# Patient Record
Sex: MALE | Race: AMERICAN INDIAN OR ALASKA NATIVE | Employment: PART TIME | URBAN - METROPOLITAN AREA
[De-identification: names, ages, dates, MRNs, and addresses within clinical notes are randomized per-mention and may not be internally consistent; named-entity substitution may affect disease eponyms.]

---

## 2015-10-19 LAB
ALT SERPL-CCNC: 30 U/L (ref 9–72)
AST SERPL-CCNC: 24 U/L (ref 14–59)
CREAT SERPL-MCNC: 1.2 MG/DL (ref 0.7–1.3)
GFR SERPL CREATININE-BSD FRML MDRD: 61.51 ML/MIN/1.73M2
GLUCOSE SERPL-MCNC: 111 MG/DL (ref 65–100)
POTASSIUM SERPL-SCNC: 4.5 MMOL/L (ref 3.6–5)

## 2018-03-13 ENCOUNTER — TRANSFERRED RECORDS (OUTPATIENT)
Dept: HEALTH INFORMATION MANAGEMENT | Facility: CLINIC | Age: 63
End: 2018-03-13

## 2018-04-17 ENCOUNTER — TRANSFERRED RECORDS (OUTPATIENT)
Dept: HEALTH INFORMATION MANAGEMENT | Facility: CLINIC | Age: 63
End: 2018-04-17

## 2018-04-17 ENCOUNTER — HOSPITAL ENCOUNTER (INPATIENT)
Facility: HOSPITAL | Age: 63
LOS: 8 days | Discharge: HOME | DRG: 885 | End: 2018-04-25
Attending: PSYCHIATRY & NEUROLOGY | Admitting: PSYCHIATRY & NEUROLOGY
Payer: COMMERCIAL

## 2018-04-17 DIAGNOSIS — F31.63: Primary | ICD-10-CM

## 2018-04-17 DIAGNOSIS — R60.0 LOCALIZED EDEMA: ICD-10-CM

## 2018-04-17 LAB
ALT SERPL-CCNC: 25 IU/L (ref 6–40)
AST SERPL-CCNC: 29 IU/L (ref 10–40)
CREAT SERPL-MCNC: 1.08 MG/DL (ref 0.7–1.2)
GLUCOSE SERPL-MCNC: 98 MG/DL (ref 70–100)
POTASSIUM SERPL-SCNC: 4.2 MEQ/L (ref 3.4–5.1)

## 2018-04-17 PROCEDURE — 12400000 ZZH R&B MH

## 2018-04-17 NOTE — IP AVS SNAPSHOT
[facility]    [address] [place] [address]    Phone:  [phone]    Fax:  [phone]                                       After Visit Summary   4/17/2018    Дмитрий Fleming    MRN: [id number]           After Visit Summary Signature Page     I have received my discharge instructions, and my questions have been answered. I have discussed any challenges I see with this plan with the nurse or doctor.    ..........................................................................................................................................  Patient/Patient Representative Signature      ..........................................................................................................................................  Patient Representative Print Name and Relationship to Patient    ..................................................               ................................................  Date                                            Time    ..........................................................................................................................................  Reviewed by Signature/Title    ...................................................              ..............................................  Date                                                            Time

## 2018-04-17 NOTE — IP AVS SNAPSHOT
MRN:[id number]                      After Visit Summary   4/17/2018    Дмитрий Fleming    MRN: [id number]           Thank you!     Thank you for choosing [facility] for your care. Our goal is always to provide you with excellent care. Hearing back from our patients is one way we can continue to improve our services. Please take a few minutes to complete the written survey that you may receive in the mail after you visit with us. Thank you!        Patient Information     Date Of Birth          1955        Designated Caregiver       Most Recent Value    Caregiver    Will someone help with your care after discharge? no      About your hospital stay     You were admitted on:  April 17, 2018 You last received care in the:  [facility]    You were discharged on:  April 25, 2018       Who to Call     For medical emergencies, please call 911.  For non-urgent questions about your medical care, please call your primary care provider or clinic, [phone]          Attending Provider     Provider Specialty    Moise Butt MD Psychiatry    Kaci Santana Mc, APRN CNP Nurse Practitioner Psych/Mental Health       Primary Care Provider Office Phone # Fax #    Marvin Hurst [phone]      Further instructions from your care team       Behavioral Discharge Planning and Instructions    Summary: Дмитрий was admitted to  due to manic symptoms and bipolar     Main Diagnosis: Bipolar Disorder 1, most recent episode manic    Major Treatments, Procedures and Findings: Stabilize with medications, connect with community programs.    Symptoms to Report: feeling more aggressive, increased confusion, losing more sleep, mood getting worse or thoughts of suicide    Lifestyle Adjustment: Take all medications as prescribed, meet with doctor/ medication provider, out patient therapist, , and [facility] worker as scheduled. Abstain from alcohol or any unprescribed drugs.      Psychiatry  Follow-up:     [facility] will be at your home and will call you 24hrs before apt.   [facility] ADRIANA Guerrero 5/23 @3   Miriam Bernal 6/14 @2  Phone: [phone]  Fax: [phone]    [facility]   PCP- Aris- 5/7 @4  [address]  [address]  [place] [address]  P: [phone]  F: [phone]    [facility]   Therapy- 4/30 @12:45 w/Ned Brunson   [address]    [place] [address]   P: [phone]  F:[phone]    Resources:   Crisis Intervention: 167.348.3644 or 036-871-6388 (TTY: 146.839.6539).  Call anytime for help.  National [place] on Mental Illness (www.[place].immanuel.org): 486.747.9799 or 417-636-3965.  Alcoholics Anonymous (www.alcoholics-anonymous.org): Check your phone book for your local chapter.  Suicide Awareness Voices of Education (SAVE) (www.save.org): 128-366-GNPT (4555)  National Suicide Prevention Line (www.mentalhealthmn.org): 761-912-TCGE (3122)  Mental Health Consumer/Survivor Network of [place] (www.mhcsn.net): 357.795.3401 or 837-201-3626    General Medication Instructions:   See your medication sheet(s) for instructions.   Take all medicines as directed.  Make no changes unless your doctor suggests them.   Go to all your doctor visits.  Be sure to have all your required lab tests. This way, your medicines can be refilled on time.  Do not use any drugs not prescribed by your doctor.  Avoid alcohol.    [place] Area:  [facility], [facility] stabilization [facility]- [phone]  [facility] Crisis Line: 1-901.995.3722  Advocates For Family Peace: 868-8795  Sexual Assault Program [facility]: 488.180.6402 or 1-953.750.7230  [place] Battered Women's Program: 6-243-281-0244 Ext: 279       Calls answered Mon-Fri-8:00 am--4:30 pm    [place]:  [facility]ates for Family Peace: 1-621.766.7715  [facility] first call for help: 1-563.458.1808  [facility]:  (554) 351-2371      [place] Area:  Warm Line: 1-293.218.5803       Calls  "answered Tuesday--Saturday 4:00 pm--10:00 pm  [place] Crisis Line - 990-940-5736  [place] [facility] Stabilization 250-287-2323    [place] Statewide:  [place] Crisis and Referral Services: 1-187.588.6897  National Suicide Prevention Lifeline: 3-077-938-TALK (8975)   - xbr9zyzr- Text  Life  to [address]  First Call for Help:   [facility] Helpline- 7-076-BOMO-HELP        Pending Results     No orders found from 4/15/2018 to 2018.            Statement of Approval     Ordered          18 1312  I have reviewed and agree with all the recommendations and orders detailed in this document.  EFFECTIVE NOW     Approved and electronically signed by:  Sarai Bell APRN CNP             Admission Information     Date & Time Provider Department Dept. Phone    2018 Kaci Santana Mc, APRN CNP [facility] [phone]      Your Vitals Were     Blood Pressure Pulse Temperature Respirations Weight Pulse Oximetry    128/73 84 97.1  F (36.2  C) (Tympanic) 16 125.1 kg (275 lb 12.8 oz) 97%      MyChart Information     [employer] lets you send messages to your doctor, view your test results, renew your prescriptions, schedule appointments and more. To sign up, go to [email or web address] . Click on \"Log in\" on the left side of the screen, which will take you to the Welcome page. Then click on \"Sign up Now\" on the right side of the page.     You will be asked to enter the access code listed below, as well as some personal information. Please follow the directions to create your username and password.     Your access code is: [email or web address]  Expires: 2018 11:28 AM     Your access code will  in 90 days. If you need help or a new code, please call your [facility] clinic or [phone].        Care EveryWhere ID     This is your Care EveryWhere ID. This could be used by other organizations to access your [facility] medical records  [id number]        Equal Access to Services     NARINDER YAP : Radha more" Sobusterali, waaxda luqadaha, qaybta kaalmada lauro, denise wususan mary kate. So [facility] [place] [facility] [phone].    ATENCIÓN: Si brenda espinoza, tiene a lira disposición servicios gratuitos de asistencia lingüística. Addison al [phone].    We comply with applicable federal civil rights laws and [place] laws. We do not discriminate on the basis of race, color, national origin, age, disability, sex, sexual orientation, or gender identity.               Review of your medicines      START taking        Dose / Directions    * divalproex sodium extended-release 500 MG 24 hr tablet   Commonly known as:  DEPAKOTE ER        Dose:  1000 mg   Start taking on:  4/26/2018   Take 2 tablets (1,000 mg) by mouth daily   Quantity:  60 tablet   Refills:  0       * divalproex sodium extended-release 250 MG 24 hr tablet   Commonly known as:  DEPAKOTE ER        Dose:  250 mg   Start taking on:  4/26/2018   Take 1 tablet (250 mg) by mouth daily   Quantity:  30 tablet   Refills:  0       triamterene-hydrochlorothiazide 37.5-25 MG per capsule   Commonly known as:  DYAZIDE   Used for:  Localized edema        Dose:  1 capsule   Start taking on:  4/26/2018   Take 1 capsule by mouth daily   Quantity:  30 capsule   Refills:  0       * Notice:  This list has 2 medication(s) that are the same as other medications prescribed for you. Read the directions carefully, and ask your doctor or other care provider to review them with you.      CONTINUE these medicines which have NOT CHANGED        Dose / Directions    CLONAZEPAM PO        Dose:  0.5 mg   Take 0.5 mg by mouth At Bedtime Take one to two tablets at bedtime   Refills:  0       DESYREL PO        Dose:  200 mg   Take 200 mg by mouth Take two tablets at bedtime   Refills:  0       HYDROXYZINE HCL PO        Dose:  50 mg   Take 50 mg by mouth Take 1/2 to 1 tablet at bedtime as needed   Refills:  0       LIPITOR PO        Dose:  20 mg   Take 20 mg by mouth daily   Refills:  0         STOP  taking     LAMICTAL PO                Where to get your medicines      These medications were sent to [id number] - [place], [place] - [address]  [address] [facility] [place] [address]     Phone:  [phone]     divalproex sodium extended-release 250 MG 24 hr tablet    divalproex sodium extended-release 500 MG 24 hr tablet    triamterene-hydrochlorothiazide 37.5-25 MG per capsule                Protect others around you: Learn how to safely use, store and throw away your medicines at www.disposemymeds.org.             Medication List: This is a list of all your medications and when to take them. Check marks below indicate your daily home schedule. Keep this list as a reference.      Medications           Morning Afternoon Evening Bedtime As Needed    CLONAZEPAM PO   Take 0.5 mg by mouth At Bedtime Take one to two tablets at bedtime   Last time this was given:  0.5 mg on 4/24/2018  8:17 PM                                DESYREL PO   Take 200 mg by mouth Take two tablets at bedtime   Last time this was given:  100 mg on 4/24/2018  8:17 PM                                * divalproex sodium extended-release 500 MG 24 hr tablet   Commonly known as:  DEPAKOTE ER   Take 2 tablets (1,000 mg) by mouth daily   Start taking on:  4/26/2018   Last time this was given:  1,000 mg on 4/25/2018  8:18 AM                                * divalproex sodium extended-release 250 MG 24 hr tablet   Commonly known as:  DEPAKOTE ER   Take 1 tablet (250 mg) by mouth daily   Start taking on:  4/26/2018   Last time this was given:  1,000 mg on 4/25/2018  8:18 AM                                HYDROXYZINE HCL PO   Take 50 mg by mouth Take 1/2 to 1 tablet at bedtime as needed   Last time this was given:  50 mg on 4/24/2018 12:21 AM                                LIPITOR PO   Take 20 mg by mouth daily   Last time this was given:  20 mg on 4/25/2018  8:15 AM                                triamterene-hydrochlorothiazide 37.5-25 MG  per capsule   Commonly known as:  DYAZIDE   Take 1 capsule by mouth daily   Start taking on:  4/26/2018   Last time this was given:  1 capsule on 4/25/2018  8:15 AM                                * Notice:  This list has 2 medication(s) that are the same as other medications prescribed for you. Read the directions carefully, and ask your doctor or other care provider to review them with you.

## 2018-04-18 PROBLEM — F31.63: Status: ACTIVE | Noted: 2018-04-18

## 2018-04-18 PROCEDURE — 99223 1ST HOSP IP/OBS HIGH 75: CPT | Performed by: NURSE PRACTITIONER

## 2018-04-18 PROCEDURE — 12400000 ZZH R&B MH

## 2018-04-18 PROCEDURE — 25000132 ZZH RX MED GY IP 250 OP 250 PS 637: Performed by: NURSE PRACTITIONER

## 2018-04-18 RX ORDER — TRAZODONE HYDROCHLORIDE 50 MG/1
50 TABLET, FILM COATED ORAL
Status: DISCONTINUED | OUTPATIENT
Start: 2018-04-18 | End: 2018-04-19

## 2018-04-18 RX ORDER — CLONAZEPAM 0.5 MG/1
0.5 TABLET ORAL AT BEDTIME
Status: DISCONTINUED | OUTPATIENT
Start: 2018-04-18 | End: 2018-04-25 | Stop reason: HOSPADM

## 2018-04-18 RX ORDER — ALUMINA, MAGNESIA, AND SIMETHICONE 2400; 2400; 240 MG/30ML; MG/30ML; MG/30ML
30 SUSPENSION ORAL EVERY 4 HOURS PRN
Status: DISCONTINUED | OUTPATIENT
Start: 2018-04-18 | End: 2018-04-25 | Stop reason: HOSPADM

## 2018-04-18 RX ORDER — ATORVASTATIN CALCIUM 10 MG/1
20 TABLET, FILM COATED ORAL DAILY
Status: DISCONTINUED | OUTPATIENT
Start: 2018-04-18 | End: 2018-04-25 | Stop reason: HOSPADM

## 2018-04-18 RX ORDER — TRAZODONE HYDROCHLORIDE 100 MG/1
100 TABLET ORAL AT BEDTIME
Status: DISCONTINUED | OUTPATIENT
Start: 2018-04-18 | End: 2018-04-25 | Stop reason: HOSPADM

## 2018-04-18 RX ORDER — BISACODYL 10 MG
10 SUPPOSITORY, RECTAL RECTAL DAILY PRN
Status: DISCONTINUED | OUTPATIENT
Start: 2018-04-18 | End: 2018-04-25 | Stop reason: HOSPADM

## 2018-04-18 RX ORDER — NAPROXEN 500 MG/1
500 TABLET ORAL 2 TIMES DAILY WITH MEALS
Status: DISCONTINUED | OUTPATIENT
Start: 2018-04-18 | End: 2018-04-25 | Stop reason: HOSPADM

## 2018-04-18 RX ORDER — OLANZAPINE 10 MG/2ML
10 INJECTION, POWDER, FOR SOLUTION INTRAMUSCULAR
Status: DISCONTINUED | OUTPATIENT
Start: 2018-04-18 | End: 2018-04-25 | Stop reason: HOSPADM

## 2018-04-18 RX ORDER — HYDROXYZINE HYDROCHLORIDE 25 MG/1
50 TABLET, FILM COATED ORAL
Status: DISCONTINUED | OUTPATIENT
Start: 2018-04-18 | End: 2018-04-25 | Stop reason: HOSPADM

## 2018-04-18 RX ORDER — LAMOTRIGINE 100 MG/1
100 TABLET ORAL DAILY
Status: DISCONTINUED | OUTPATIENT
Start: 2018-04-18 | End: 2018-04-19

## 2018-04-18 RX ORDER — LAMOTRIGINE 25 MG/1
50 TABLET ORAL DAILY
Status: DISCONTINUED | OUTPATIENT
Start: 2018-04-23 | End: 2018-04-19

## 2018-04-18 RX ORDER — OLANZAPINE 10 MG/1
10 TABLET ORAL
Status: DISCONTINUED | OUTPATIENT
Start: 2018-04-18 | End: 2018-04-25 | Stop reason: HOSPADM

## 2018-04-18 RX ORDER — DIVALPROEX SODIUM 500 MG/1
500 TABLET, EXTENDED RELEASE ORAL DAILY
Status: DISCONTINUED | OUTPATIENT
Start: 2018-04-18 | End: 2018-04-19

## 2018-04-18 RX ORDER — HYDROXYZINE HYDROCHLORIDE 25 MG/1
25-50 TABLET, FILM COATED ORAL EVERY 4 HOURS PRN
Status: DISCONTINUED | OUTPATIENT
Start: 2018-04-18 | End: 2018-04-25 | Stop reason: HOSPADM

## 2018-04-18 RX ORDER — ACETAMINOPHEN 325 MG/1
650 TABLET ORAL EVERY 4 HOURS PRN
Status: DISCONTINUED | OUTPATIENT
Start: 2018-04-18 | End: 2018-04-25 | Stop reason: HOSPADM

## 2018-04-18 RX ORDER — TRAZODONE HYDROCHLORIDE 100 MG/1
200 TABLET ORAL AT BEDTIME
Status: DISCONTINUED | OUTPATIENT
Start: 2018-04-18 | End: 2018-04-18

## 2018-04-18 RX ADMIN — DIVALPROEX SODIUM 500 MG: 500 TABLET, FILM COATED, EXTENDED RELEASE ORAL at 16:58

## 2018-04-18 RX ADMIN — TRAZODONE HYDROCHLORIDE 50 MG: 50 TABLET ORAL at 01:01

## 2018-04-18 RX ADMIN — LAMOTRIGINE 100 MG: 100 TABLET ORAL at 16:58

## 2018-04-18 RX ADMIN — HYDROXYZINE HYDROCHLORIDE 50 MG: 25 TABLET ORAL at 01:01

## 2018-04-18 RX ADMIN — ACETAMINOPHEN 650 MG: 325 TABLET ORAL at 19:13

## 2018-04-18 RX ADMIN — CLONAZEPAM 0.5 MG: 0.5 TABLET ORAL at 20:54

## 2018-04-18 RX ADMIN — ACETAMINOPHEN 650 MG: 325 TABLET ORAL at 08:55

## 2018-04-18 RX ADMIN — ATORVASTATIN CALCIUM 20 MG: 10 TABLET, FILM COATED ORAL at 16:58

## 2018-04-18 RX ADMIN — TRAZODONE HYDROCHLORIDE 100 MG: 100 TABLET ORAL at 20:54

## 2018-04-18 RX ADMIN — NAPROXEN 500 MG: 500 TABLET ORAL at 16:58

## 2018-04-18 ASSESSMENT — ACTIVITIES OF DAILY LIVING (ADL)
DRESS: INDEPENDENT
LAUNDRY: UNABLE TO COMPLETE
BATHING: 0-->INDEPENDENT
DRESS: SCRUBS (BEHAVIORAL HEALTH)
COGNITION: 0 - NO COGNITION ISSUES REPORTED
ORAL_HYGIENE: INDEPENDENT
DRESS: 0-->INDEPENDENT
TRANSFERRING: 0-->INDEPENDENT
AMBULATION: 0-->INDEPENDENT
RETIRED_COMMUNICATION: 0-->UNDERSTANDS/COMMUNICATES WITHOUT DIFFICULTY
GROOMING: INDEPENDENT
FALL_HISTORY_WITHIN_LAST_SIX_MONTHS: YES
TOILETING: 0-->INDEPENDENT
ORAL_HYGIENE: INDEPENDENT
GROOMING: INDEPENDENT
LAUNDRY: UNABLE TO COMPLETE
NUMBER_OF_TIMES_PATIENT_HAS_FALLEN_WITHIN_LAST_SIX_MONTHS: 3
SWALLOWING: 0-->SWALLOWS FOODS/LIQUIDS WITHOUT DIFFICULTY
RETIRED_EATING: 0-->INDEPENDENT

## 2018-04-18 NOTE — PLAN OF CARE
"Problem: Patient Care Overview  Goal: Individualization & Mutuality  Pt will comply with treatment team recommendations  Pt will take medications as prescribed  Pt is wearing a fall wrist bracelet d/t unsteadiness upon rising from bed, remind pt of fall prevention   4-18-18  Patient may wean to open unit if behavior remains appropriate. Must be compliant with contraband searches upon return to [facility]. Will reassess daily.         Pt denies HI SI anxiety and hallucinations gave tylenol 650 mg for treatment of generalized muscle and joint pain, \" I think this pain is from all the walking I have been doing, I lost 20 lbs from walking\"  Pt acknowledges that when he is manic he is \" impulsive and can't say no\", \" I got a call from a person selling insurance and I even bought additional insurance for my car that I didn't need\".       Pt is talkative, cooperative, was appropriate when weaned into open unit,  Total time this day;  approximately 1.5 hours.      Problem: Thought Process Alteration (Adult)  Goal: Improved Thought Process  Patient will demonstrate the desired outcomes by discharge/transition of care.   Pt is able to engage in reality based conversations with this writer.      "
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
## 2018-04-18 NOTE — PLAN OF CARE
Face to face end of shift report received from CHELY Rosario RN . Rounding completed. Patient observed resting in bed.     Rhona Anguiano  4/18/2018  7:38 AM
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
## 2018-04-18 NOTE — PLAN OF CARE
Pt politely asked if he could finish admit questions during the day as he was tired.  Of course I told him.

## 2018-04-18 NOTE — PLAN OF CARE
Face to face end of shift report received from Rhona LOBO RN. Rounding completed. Patient observed in bed awake.     Myah Watt  4/18/2018  3:59 PM
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
## 2018-04-18 NOTE — PROGRESS NOTES
04/18/18 0101   Patient Belongings   Did you bring any home meds/supplements to the hospital?  Yes  (given to nurse)   Patient Belongings shoes;wallet;clothing   Disposition of Belongings Locker  (patient belongings sent to cubby in patient belongings room)   Belongings Search Yes   Clothing Search Yes   Second Staff Chantal MURCIA   General Info Comment black wrangler jeans, , black wolverine boots, leather silversmith wallet, 1 pair black socks, 1 pair white socks, 1 pair grey non-slip socks, black tshirt, black eagle hat, black carhartt coat, personal business cards, black pullover sweater, 5 library books, 5 subject notebook, red address book, blue tube chapstick    List items sent to safe: $10 bill, u care card, 3 [place] '[place] license, [employer] bank card, [place] EBT card, leather appearing cord with bag, leather necklace with brown beads, blue and black [employer] flip phone  All other belongings put in assigned cubby in belongings room.     I have reviewed my belongings list on admission and verify that it is correct.     Patient signature_______________________________    Second staff witness (if patient unable to sign) ______________________________       I have received all my belongings at discharge.    Patient signature________________________________    J[place]ica CARBALLO  4/18/2018  1:07 AM

## 2018-04-18 NOTE — PLAN OF CARE
Problem: Patient Care Overview  Goal: Team Discussion  Team Plan:   BEHAVIORAL TEAM DISCUSSION    Participants: Jerry Gooden NP, Kaci Santana NP, Shy [facility], Aleshia TAMAYOW, Kayleigh Moran RN, Santiago Collins RN, Tiff Guo [employer], Santa Peacock OT, Sarai Foy OT  Progress: new admit  Continued Stay Criteria/Rationale: new admit, NP to assess, manic  Medical/Physical: edema in LEs  Precautions:   Behavioral Orders   Procedures     Code 1 - Restrict to Unit     Routine Programming     As clinically indicated     Self Injury Precaution     Status 15     Every 15 minutes.     Plan: NP to assess and determine  Rationale for change in precautions or plan: None

## 2018-04-19 PROCEDURE — 25000132 ZZH RX MED GY IP 250 OP 250 PS 637: Performed by: NURSE PRACTITIONER

## 2018-04-19 PROCEDURE — 12400000 ZZH R&B MH

## 2018-04-19 PROCEDURE — 99232 SBSQ HOSP IP/OBS MODERATE 35: CPT | Performed by: NURSE PRACTITIONER

## 2018-04-19 RX ORDER — GABAPENTIN 100 MG/1
200 CAPSULE ORAL 2 TIMES DAILY
Status: DISCONTINUED | OUTPATIENT
Start: 2018-04-19 | End: 2018-04-21

## 2018-04-19 RX ORDER — LAMOTRIGINE 25 MG/1
50 TABLET ORAL DAILY
Status: COMPLETED | OUTPATIENT
Start: 2018-04-21 | End: 2018-04-23

## 2018-04-19 RX ORDER — DIVALPROEX SODIUM 500 MG/1
1000 TABLET, EXTENDED RELEASE ORAL DAILY
Status: DISCONTINUED | OUTPATIENT
Start: 2018-04-20 | End: 2018-04-25 | Stop reason: HOSPADM

## 2018-04-19 RX ORDER — MINERAL OIL/HYDROPHIL PETROLAT
OINTMENT (GRAM) TOPICAL DAILY
Status: DISCONTINUED | OUTPATIENT
Start: 2018-04-19 | End: 2018-04-25 | Stop reason: HOSPADM

## 2018-04-19 RX ORDER — LAMOTRIGINE 25 MG/1
75 TABLET ORAL DAILY
Status: COMPLETED | OUTPATIENT
Start: 2018-04-20 | End: 2018-04-20

## 2018-04-19 RX ADMIN — CLONAZEPAM 0.5 MG: 0.5 TABLET ORAL at 20:51

## 2018-04-19 RX ADMIN — NAPROXEN 500 MG: 500 TABLET ORAL at 08:44

## 2018-04-19 RX ADMIN — NAPROXEN 500 MG: 500 TABLET ORAL at 16:46

## 2018-04-19 RX ADMIN — ATORVASTATIN CALCIUM 20 MG: 10 TABLET, FILM COATED ORAL at 08:44

## 2018-04-19 RX ADMIN — DIVALPROEX SODIUM 500 MG: 500 TABLET, FILM COATED, EXTENDED RELEASE ORAL at 08:44

## 2018-04-19 RX ADMIN — TRAZODONE HYDROCHLORIDE 100 MG: 100 TABLET ORAL at 20:51

## 2018-04-19 RX ADMIN — LAMOTRIGINE 100 MG: 100 TABLET ORAL at 08:44

## 2018-04-19 RX ADMIN — GABAPENTIN 200 MG: 100 CAPSULE ORAL at 20:51

## 2018-04-19 RX ADMIN — HYDROXYZINE HYDROCHLORIDE 50 MG: 25 TABLET ORAL at 23:37

## 2018-04-19 RX ADMIN — WHITE PETROLATUM: 1.75 OINTMENT TOPICAL at 20:51

## 2018-04-19 RX ADMIN — ACETAMINOPHEN 650 MG: 325 TABLET ORAL at 12:40

## 2018-04-19 ASSESSMENT — ACTIVITIES OF DAILY LIVING (ADL)
GROOMING: INDEPENDENT;PROMPTS
ORAL_HYGIENE: INDEPENDENT;PROMPTS
DRESS: SCRUBS (BEHAVIORAL HEALTH)
LAUNDRY: UNABLE TO COMPLETE
ORAL_HYGIENE: INDEPENDENT
DRESS: SCRUBS (BEHAVIORAL HEALTH);INDEPENDENT
GROOMING: INDEPENDENT

## 2018-04-19 NOTE — PLAN OF CARE
Problem: Patient Care Overview  Goal: Individualization & Mutuality  Pt will comply with treatment team recommendations  Pt will take medications as prescribed  Pt is wearing a fall wrist bracelet d/t unsteadiness upon rising from bed, remind pt of fall prevention   4-18-18  Patient may wean to open unit if behavior remains appropriate. Must be compliant with contraband searches upon return to [facility]ICU. Will reassess daily.         Outcome: Improving  Patient has been calm, cooperative, and medication compliant this shift.  He is weaning at will to open unit and is appropriate.  He attended a few groups and is social with peers and staff on the unit.  Denies all mental health criteria and feels he is doing well.  He states he will be ready to discharge tomorrow and plans to talk with the provider tomorrow. Complained of 7/10 knee pain and received Tylenol 650 mg at 1913. Patient reported a decrease in pain.  VS WNL.     Problem: Thought Process Alteration (Adult)  Goal: Improved Thought Process  Patient will demonstrate the desired outcomes by discharge/transition of care.   Outcome: Improving  Patient had a reality based conversation with this writer.  He talked a lot about his kids and grand kids.  Also spoke about writing a book and doing research about ancient  cultures. Speech is clear and non-pressured.

## 2018-04-19 NOTE — PLAN OF CARE
1913: Pt requested and received PRN acetaminophen 650 mg PO for left knee pain. Pt rated pain a 7/10.

## 2018-04-19 NOTE — PLAN OF CARE
Problem: Patient Care Overview  Goal: Individualization & Mutuality  Pt will comply with treatment team recommendations  Pt will take medications as prescribed  Pt is wearing a fall wrist bracelet d/t unsteadiness upon rising from bed, remind pt of fall prevention        Outcome: No Change  Pt has been up and about on the unit all shift. Social with peers. Pleasant and cooperative, for the most part--but does demonstrate periods of irritability, grandiosity and entitled behavior. Mood lability also present this shift--with pt calm and cooperative one minute, then tearful and depressed the next. Repeated reports of (L) hip and (L) leg pain throughout the shift. Received scheduled Naprosyn with morning medications and PRN APAP 650 mg PO at approximately 1240 this afternoon. Pt did shower this afternoon, after repeated prompting from staff. Bilateral lower extremity pitting edema present--3+ (L) leg/2+ (R) leg. Provider (Kaci Santana NP) aware. Gait is slow, but steady and balanced. Has remained free from fall and/or injury this shift.    Problem: Depression (Adult,Obstetrics,Pediatric)  Goal: Establish/Maintain Self-Care Routine  Patient will demonstrate the desired outcomes by discharge/transition of care.   Outcome: Improving  Pt showered this afternoon, after repeated prompts from staff.    Problem: Thought Process Alteration (Adult)  Goal: Improved Thought Process  Patient will demonstrate the desired outcomes by discharge/transition of care.   Demonstrates grandiosity and mood lability.

## 2018-04-19 NOTE — PLAN OF CARE
Face to face end of shift report received from LUIS A Vasques RN. Rounding completed. Patient observed, resting in bed in ICU.     Jose Miguel Colin  4/19/2018  8:04 AM

## 2018-04-19 NOTE — PLAN OF CARE
Problem: Patient Care Overview  Goal: Individualization & Mutuality  Pt will comply with treatment team recommendations  Pt will take medications as prescribed  Pt is wearing a fall wrist bracelet d/t unsteadiness upon rising from bed, remind pt of fall prevention   4-18-18  Patient may wean to open unit if behavior remains appropriate. Must be compliant with contraband searches upon return to MHICU. Will reassess daily.         Outcome: Improving  Slept all noc without issue

## 2018-04-19 NOTE — PLAN OF CARE
Face to face end of shift report received from Rochelle IGLESIAS RN. Rounding completed. Patient observed in group.     Myah Watt  4/19/2018  3:59 PM

## 2018-04-19 NOTE — PLAN OF CARE
Problem: Patient Care Overview  Goal: Team Discussion  Team Plan:   BEHAVIORAL TEAM DISCUSSION    Participants: Jerry Gooden NP, Kaci Santana NP,  Shy Gregory [facility], Aleshia CRAWFORD, Kayleigh Moran RN, Rafael Rhodes RN. Tiff Guo [employer]creation [employer], Santa Peacock OT, Sarai Foy OT  Progress:Minimal: still hypomanic  Continued Stay Criteria/Rationale: Will taper off Lamictal and start on Depakote.  Take off Trazadone.  Medical/Physical: Pain in left hip, back and knee.  Edema in both legs.  Precautions:   Behavioral Orders   Procedures     Code 1 - Restrict to Unit     Routine Programming     As clinically indicated     Self Injury Precaution     Status 15     Every 15 minutes.     Plan: Stabilize and discharge home with services.  Rationale for change in precautions or plan: n/a

## 2018-04-19 NOTE — PLAN OF CARE
Face to face end of shift report received from JOE Glasgow. Rounding completed. Patient observed. Lying on left side - eyes closed - non-labored breathing noted - continuous camera and 15 min physical checks.    Marlene Vasques  4/19/2018  2:11 AM

## 2018-04-19 NOTE — PROGRESS NOTES
"[facility]  Psychiatric Progress Note      Impression:   Patient Дмитрий Fleming is a 62 year old male admitted on 4/17/2018 with severe mixed bipolar 1 disorder without psychosis.    Patient interviewed and requests to go home.  He had limited insight and talked about reason for hospitalization is to stabilize on medications for mood. He is agreeable to this. Reports pain issues due to past electrocution, not resolved with naprosyn that he takes at home. Expressed anxiety about overdue library books, pet cats, roommate and tremor in hands. Tremor present at intake but patient insistent it is \"due to the medications\".  Reviewed medication changes with him including taper off Lamictal and titration of Depakote along with starting gabapentin for anxiety with possible benefits for pain. He is drawing photos and describes the illogical nature of man versus himself.  Shows this writer list of names of people he needs to talk with including Charly Roberts and Juan A Loco, states multiple problems with \"the Jews\" then states \"pardon me\" and draws a Star of Ind[place] and Nazi logo on piece of paper during interview. Patient expounds on conspiracy of local city planners and ripplrr ince about plans he is making and help he has provided to community, particularly focused on Art Therapy. States he slept well last night. Denies auditory or visual hallucinations. Appetite is good. Not attending groups. Review of chart notes peripheral edema since 2009.     Educated regarding medication indications, risks, benefits, side effects, contraindications and possible interactions. Verbally expressed understanding.        Diagnoses:   Bipolar Disorder 1, most recent episode manic    Attestation:  Patient has been seen and evaluated by me,  WESLEY Gamboa CNP          Interim History:   The patient's care was discussed with the treatment team and chart notes were reviewed.          Medications:     No current outpatient " prescriptions on file.      10 point ROS positive for knee pain, left hip pain and low back pain       Allergies:   No Known Allergies         Psychiatric Examination:   /79  Pulse 84  Temp 98  F (36.7  C) (Tympanic)  Resp 16  SpO2 97%  Weight is 0 lbs 0 oz  There is no height or weight on file to calculate BMI.    Appearance:  awake, alert  Attitude:  cooperative  Eye Contact:  good  Mood:  anxious  Affect:  mood congruent  Speech:  clear, coherent  Psychomotor Behavior:  no evidence of tardive dyskinesia, dystonia, or tics, tremor observed  and was present at intake  Thought Process:  illogical and paranoid  Associations:  loosening of associations present  Thought Content:  no evidence of suicidal ideation or homicidal ideation, no auditory hallucinations present, no visual hallucinations present, obsessions present and paranoid thoughts  Insight:  limited  Judgment:  limited  Oriented to:  time, person, and place  Attention Span and Concentration:  fair  Recent and Remote Memory:  intact  Fund of Knowledge: appropriate  Muscle Strength and Tone: normal  Gait and Station: Normal         Labs:   No results found for this or any previous visit.         Plan:   Continue Inpatient Hospitalization  Continue to provide a safe environment and therapeutic milieu  Increase Depakote to 1000 mg once a day.  Decrease Lamictal to 75mg for two doses then 50 mg.  Combination of Depakote and Lamictal and increase plasma levels.  Start gabapentin 200 mg twice a day for anxiety and may help with pain issues.  Consider Hospitalist consult for edema issues.  Order lotion for heels of feet    ELOS: 3-5 days for stabilization of bipolar disorder and observation of new medication tolerance and efficacy

## 2018-04-20 PROCEDURE — 12400000 ZZH R&B MH

## 2018-04-20 PROCEDURE — 25000132 ZZH RX MED GY IP 250 OP 250 PS 637: Performed by: NURSE PRACTITIONER

## 2018-04-20 PROCEDURE — 99232 SBSQ HOSP IP/OBS MODERATE 35: CPT | Performed by: NURSE PRACTITIONER

## 2018-04-20 RX ADMIN — ACETAMINOPHEN 650 MG: 325 TABLET ORAL at 21:08

## 2018-04-20 RX ADMIN — DIVALPROEX SODIUM 1000 MG: 500 TABLET, FILM COATED, EXTENDED RELEASE ORAL at 08:22

## 2018-04-20 RX ADMIN — GABAPENTIN 200 MG: 100 CAPSULE ORAL at 21:00

## 2018-04-20 RX ADMIN — CLONAZEPAM 0.5 MG: 0.5 TABLET ORAL at 21:00

## 2018-04-20 RX ADMIN — TRAZODONE HYDROCHLORIDE 100 MG: 100 TABLET ORAL at 21:00

## 2018-04-20 RX ADMIN — OLANZAPINE 10 MG: 10 TABLET, FILM COATED ORAL at 01:21

## 2018-04-20 RX ADMIN — ATORVASTATIN CALCIUM 20 MG: 10 TABLET, FILM COATED ORAL at 08:21

## 2018-04-20 RX ADMIN — NAPROXEN 500 MG: 500 TABLET ORAL at 18:09

## 2018-04-20 RX ADMIN — LAMOTRIGINE 75 MG: 25 TABLET ORAL at 08:21

## 2018-04-20 RX ADMIN — WHITE PETROLATUM: 1.75 OINTMENT TOPICAL at 08:34

## 2018-04-20 RX ADMIN — GABAPENTIN 200 MG: 100 CAPSULE ORAL at 08:21

## 2018-04-20 RX ADMIN — NAPROXEN 500 MG: 500 TABLET ORAL at 08:21

## 2018-04-20 RX ADMIN — OLANZAPINE 10 MG: 10 TABLET, FILM COATED ORAL at 21:08

## 2018-04-20 NOTE — PLAN OF CARE
Problem: Patient Care Overview  Goal: Team Discussion  Team Plan:   BEHAVIORAL TEAM DISCUSSION    Participants: Yeny NP,Jerry Gooden NP, Kaci Santana NP,  Shy GIORDANOSW, Aleshia TAMAYOW, Santiago Collins RN. Santa Peacock OT, Sarai Foy OT  Progress: Minimal: still hypomanic  Continued Stay Criteria/Rationale: Increase Depakote to 1000 mg once a day. Decrease Lamictal to 75mg for two doses then 50 mg.  Combination of Depakote and Lamictal and increase plasma levels. Start gabapentin 200 mg twice a day for anxiety and may help with pain issues  Medical/Physical: Pain in left hip, back and knee.  Edema in both legs.  Precautions:   Behavioral Orders   Procedures     Code 1 - Restrict to Unit     Routine Programming     As clinically indicated     Self Injury Precaution     Status 15     Every 15 minutes.     Plan: Stabilize and discharge home with services.  Rationale for change in precautions or plan: none

## 2018-04-20 NOTE — PLAN OF CARE
Problem: Patient Care Overview  Goal: Individualization & Mutuality  Pt will comply with treatment team recommendations  Pt will take medications as prescribed  Pt is wearing a fall wrist bracelet d/t unsteadiness upon rising from bed, remind pt of fall prevention         Outcome: Improving  Patient has been irritable with staff all shift.  He is dismissive of this writer but is social and appropriate with peers.  Becomes easily frustrated when questions are not answered immediately.  He attended some groups today and was appropriate in group.  Denies all mental health issues at this time.  Has bilateral lower leg edema.  Talked with patient about his medications being tapered down, verbalizes understanding.  Patient states he has some pain but did not request PRN medication. VS WNL. Will continue to monitor.     Problem: Depression (Adult,Obstetrics,Pediatric)  Goal: Improved/Stable Mood  Patient will demonstrate the desired outcomes by discharge/transition of care.   Outcome: Improving  Patient denies depression this shift.     Problem: Thought Process Alteration (Adult)  Goal: Improved Thought Process  Patient will have reality based conversation by discharge.  Outcome: Improving  Patient had reality based conversation with this writer.

## 2018-04-20 NOTE — PROGRESS NOTES
"[facility]  Psychiatric Progress Note    Subjective         This is a 62 year old male with severe, mixed bipolar 1 w/ psychosis. 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 He is paranoid citing several conspiracy theories and the importance of connecting with Charly Roberts.Bear reports difficulty falling asleep \"but when I fall asleep, I sleep\". He often feels groggy in the am. Pt believes at this time his medications are \"good\". He denies any side effects.          DIagnoses:    Bipolar 1 Disorder, most recent episode manic    Attestation:  Patient has been seen and evaluated by me,  WESLEY Tejada CNP          Interim History:   The patient's care was discussed with the treatment team and chart notes were reviewed.          Medications:       atorvastatin (LIPITOR) tablet 20 mg  20 mg Oral Daily     clonazePAM (klonoPIN) tablet 0.5 mg  0.5 mg Oral At Bedtime     divalproex sodium extended-release  1,000 mg Oral Daily     gabapentin  200 mg Oral BID     [START ON 4/21/2018] lamoTRIgine  50 mg Oral Daily     mineral oil-hydrophilic petrolatum   Topical Daily     naproxen  500 mg Oral BID w/meals     traZODone (DESYREL) tablet 100 mg  100 mg Oral At Bedtime     acetaminophen, alum & mag hydroxide-simethicone, bisacodyl, hydrOXYzine, hydrOXYzine, magnesium hydroxide, OLANZapine **OR** OLANZapine          Allergies:   No Known Allergies           10 point ROS positive for knee pain (bilateral), left hip pain, and low back pain.       Psychiatric Examination:   /59  Pulse 63  Temp 98.5  F (36.9  C) (Tympanic)  Resp 12  SpO2 97%  Weight is 0 lbs 0 oz  There is no height or weight on file to calculate BMI.    Appearance:  awake, alert, dressed in hospital scrubs and slightly unkempt  Attitude:  cooperative  Eye Contact:  good  Mood:  good  Affect: "  mood congruent and intensity is dramatic  Speech:  rambling  Psychomotor Behavior:  no evidence of tardive dyskinesia, dystonia, or tics and intact station, gait and muscle tone  Thought Process:  illogical & paranoid  Associations:  loosening of associations present  Thought Content:  no evidence of suicidal ideation or homicidal ideation, no auditory hallucinations present and no visual hallucinations present  Insight:  limited  Judgment:  limited  Oriented to:  time, person, and place  Attention Span and Concentration:  fair  Recent and Remote Memory:  intact  Fund of Knowledge: appropriate  Muscle Strength and Tone: normal  Gait and Station: Normal  Perception: no perceptual disorder noted         Labs:   No results found for this or any previous visit (from the past 24 hour(s)).          Assessment/ Plan:   Continue inpatient hospitalization.   Continue on gabapentin 200 mg BID , will reassess in am w/ plans to increase.  Soak feet in am after breakfast prior to nail care.  Will further assess LE edema @ that time however this is not new and most likely r/t body habitus.      ELOS: 3-5 days for stabilization of bipolar disorder along with observation of pts response to medications.

## 2018-04-20 NOTE — PLAN OF CARE
Face to face end of shift report received from Rhona LOBO RN. Rounding completed. Patient observed in the lounge.     Javi Lutz  4/20/2018  5:38 PM

## 2018-04-20 NOTE — PLAN OF CARE
Face to face end of shift report received from JOE Glasgow. Rounding completed. Patient observed. Lying in bed  - supine position - does have both lower legs elevated - eyes closed - non-labored breathing noted - some restlessness noted.     Marlene Vasques  4/20/2018  2:59 AM      ]

## 2018-04-20 NOTE — PLAN OF CARE
Problem: Patient Care Overview  Goal: Individualization & Mutuality  Pt will comply with treatment team recommendations  Pt will take medications as prescribed  Pt is wearing a fall wrist bracelet d/t unsteadiness upon rising from bed, remind pt of fall prevention         Pt denies HI SI anxiety hallucinations and pain.      Pt engages with staff and peers, is bright, compliant with medications, and treatment team recommendations.      Problem: Thought Process Alteration (Adult)  Goal: Improved Thought Process  Patient will have reality based conversation by discharge.   Pt is able to engage in reality based conversations,  Continues to be tangential.

## 2018-04-20 NOTE — PLAN OF CARE
Face to face end of shift report received from Marlene MO RN. Rounding completed. Patient observed.     Rhona Anguiano  4/20/2018  7:40 AM

## 2018-04-21 PROCEDURE — 25000132 ZZH RX MED GY IP 250 OP 250 PS 637: Performed by: NURSE PRACTITIONER

## 2018-04-21 PROCEDURE — 99232 SBSQ HOSP IP/OBS MODERATE 35: CPT | Performed by: NURSE PRACTITIONER

## 2018-04-21 PROCEDURE — 12400000 ZZH R&B MH

## 2018-04-21 RX ORDER — TRIAMTERENE AND HYDROCHLOROTHIAZIDE 37.5; 25 MG/1; MG/1
1 CAPSULE ORAL DAILY
Status: DISCONTINUED | OUTPATIENT
Start: 2018-04-21 | End: 2018-04-25 | Stop reason: HOSPADM

## 2018-04-21 RX ORDER — GABAPENTIN 300 MG/1
300 CAPSULE ORAL 2 TIMES DAILY
Status: DISCONTINUED | OUTPATIENT
Start: 2018-04-21 | End: 2018-04-24

## 2018-04-21 RX ADMIN — NAPROXEN 500 MG: 500 TABLET ORAL at 08:49

## 2018-04-21 RX ADMIN — NAPROXEN 500 MG: 500 TABLET ORAL at 17:27

## 2018-04-21 RX ADMIN — TRAZODONE HYDROCHLORIDE 100 MG: 100 TABLET ORAL at 20:50

## 2018-04-21 RX ADMIN — CLONAZEPAM 0.5 MG: 0.5 TABLET ORAL at 20:50

## 2018-04-21 RX ADMIN — LAMOTRIGINE 50 MG: 25 TABLET ORAL at 08:49

## 2018-04-21 RX ADMIN — TRIAMTERENE AND HYDROCHLOROTHIAZIDE 1 CAPSULE: 37.5; 25 CAPSULE ORAL at 12:50

## 2018-04-21 RX ADMIN — WHITE PETROLATUM: 1.75 OINTMENT TOPICAL at 08:56

## 2018-04-21 RX ADMIN — OLANZAPINE 10 MG: 10 TABLET, FILM COATED ORAL at 11:23

## 2018-04-21 RX ADMIN — GABAPENTIN 300 MG: 300 CAPSULE ORAL at 20:50

## 2018-04-21 RX ADMIN — ATORVASTATIN CALCIUM 20 MG: 10 TABLET, FILM COATED ORAL at 08:49

## 2018-04-21 RX ADMIN — DIVALPROEX SODIUM 1000 MG: 500 TABLET, FILM COATED, EXTENDED RELEASE ORAL at 08:49

## 2018-04-21 RX ADMIN — GABAPENTIN 200 MG: 100 CAPSULE ORAL at 08:49

## 2018-04-21 ASSESSMENT — ACTIVITIES OF DAILY LIVING (ADL)
LAUNDRY: UNABLE TO COMPLETE
GROOMING: INDEPENDENT
ORAL_HYGIENE: INDEPENDENT
DRESS: INDEPENDENT

## 2018-04-21 NOTE — PLAN OF CARE
Face to face end of shift report received from Rhona KHALIL RN. Rounding completed. Patient observed in the cheek.     Javi Lutz  4/21/2018  4:56 PM

## 2018-04-21 NOTE — PLAN OF CARE
Face to face end of shift report received from Marlene MO RN. Rounding completed. Patient observed resting in bed.     Rhona Anguiano  4/21/2018  7:56 AM

## 2018-04-21 NOTE — PLAN OF CARE
Face to face end of shift report received from JOE Caldwell. Rounding completed. Patient observed. Lying in supine position - eyes closed - non-labored breathing noted.     Marlene Vasques  4/21/2018  2:21 AM

## 2018-04-21 NOTE — PLAN OF CARE
Problem: Patient Care Overview  Goal: Individualization & Mutuality  Pt will comply with treatment team recommendations  Pt will take medications as prescribed  Pt is wearing a fall wrist bracelet d/t unsteadiness upon rising from bed, remind pt of fall prevention         Outcome: Improving  Slept all noc without issue.

## 2018-04-21 NOTE — PROGRESS NOTES
"[facility]  Psychiatric Progress Note    Subjective         This is a 62 year old male with severe, mixed bipolar 1 w/ psychosis. \"Bear\"  Today is busy writing grievance notices to \"all upper level administration\" as he is of the belief that he is being held hostage at this camp. Tristan is fairly easy to redirect, I told him I would need to put him on a 72 hold if her were to leave as he remains disorganized at times with delusions. 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 Nail care was provided as pts nails were overgrown making wearing shoes painful. LE with +1 edema to mid calf. Tristan reports this is not new and happens fairly often, more so in the summer with warmer weather. Pt remains difficult to follow due to his rambling pressured speech. Reports \"good sleep\" last night.       DIagnoses:    Bipolar 1 Disorder, most recent episode manic    Attestation:  Patient has been seen and evaluated by me,  WESLEY Tejada CNP          Interim History:   The patient's care was discussed with the treatment team and chart notes were reviewed.          Medications:       atorvastatin (LIPITOR) tablet 20 mg  20 mg Oral Daily     clonazePAM (klonoPIN) tablet 0.5 mg  0.5 mg Oral At Bedtime     divalproex sodium extended-release  1,000 mg Oral Daily     gabapentin  200 mg Oral BID     lamoTRIgine  50 mg Oral Daily     mineral oil-hydrophilic petrolatum   Topical Daily     naproxen  500 mg Oral BID w/meals     traZODone (DESYREL) tablet 100 mg  100 mg Oral At Bedtime     triamterene-hydrochlorothiazide  1 capsule Oral Daily     acetaminophen, alum & mag hydroxide-simethicone, bisacodyl, hydrOXYzine, hydrOXYzine, magnesium hydroxide, OLANZapine **OR** OLANZapine          Allergies:   No Known Allergies           10 point ROS positive for knee pain (bilateral), left hip pain, and low back pain.       Psychiatric Examination:   /86  Pulse 82 "  Temp 99.3  F (37.4  C) (Tympanic)  Resp 20  SpO2 97%  Weight is 0 lbs 0 oz  There is no height or weight on file to calculate BMI.    Appearance:  awake, alert, dressed in hospital scrubs and slightly unkempt  Attitude:  cooperative  Eye Contact:  good  Mood:  good  Affect:  mood congruent and intensity is dramatic  Speech:  rambling  Psychomotor Behavior:  no evidence of tardive dyskinesia, dystonia, or tics and intact station, gait and muscle tone  Thought Process:  illogical & paranoid  Associations:  loosening of associations present  Thought Content:  no evidence of suicidal ideation or homicidal ideation, no auditory hallucinations present and no visual hallucinations present  Insight:  limited  Judgment:  limited  Oriented to:  time, person, and place  Attention Span and Concentration:  fair  Recent and Remote Memory:  intact  Fund of Knowledge: appropriate  Muscle Strength and Tone: normal  Gait and Station: Normal  Perception: no perceptual disorder noted    Exam:  A & O X3  Heart is in a RRR w/o murmur  Lung sounds CTAB  Bilateral LE +1 edema to mid calf.         Labs:   No results found for this or any previous visit (from the past 24 hour(s)).          Assessment/ Plan:   Continue inpatient hospitalization.   Gabapentin increased from 200 mg BID to 300 mg BID.  Dyazide 1 capsule daily.  ELOS: 3-5 days for stabilization of bipolar disorder along with observation of pts response to medications.    Pt was instructed on drug, dose, route, action, and anticipated outcome along with potential side effects. Pt verbalized understanding.

## 2018-04-21 NOTE — PLAN OF CARE
"Problem: Patient Care Overview  Goal: Individualization & Mutuality  Pt will comply with treatment team recommendations  Pt will take medications as prescribed  Pt is wearing a fall wrist bracelet d/t unsteadiness upon rising from bed, remind pt of fall prevention         Pt denies HI SI anxiety hallucinations and takes scheduled Naproxen for generalized pain. Pt attends groups, spends most of day in lounge talking to staff and peers,   Pt had feet soaked prior to have feet assessed by Yeny PAGE , requested to talk to someone about the grievance he filed yesterday.  Pt is talkative and social on the unit, bright and takes medications without issue.  Pt was given a had written list of his scheduled medications and information packet on gabapentin.  \" I want to know exactly what I'm taking and when, it's important to me to know what I am taking and why I am taking them.\"  Pt continues to be tangential and grandiose.      Pt handed this writer a copy of a letter he is sending to  regarding the grievance he submitted regarding his length of stay here.  Letter is in outgoing mail and copy is  in paper chart.         Problem: Thought Process Alteration (Adult)  Goal: Improved Thought Process  Patient will have reality based conversation by discharge.   Pt engages in reality based observations,  Maintains he has two degrees in art and engages in conversation about  heritage and cultural norms, and [place] history.        "

## 2018-04-21 NOTE — PLAN OF CARE
Problem: Patient Care Overview  Goal: Individualization & Mutuality  Pt will comply with treatment team recommendations  Pt will take medications as prescribed  Pt is wearing a fall wrist bracelet d/t unsteadiness upon rising from bed, remind pt of fall prevention         Outcome: Improving  Patient has been cooperative. He has complained about being here this long and wanting to discharge. He agreed to talk to his provider in the morning about this. He also complained of swelling in his lower extremities. His left ankle did seem more edematous than his right. Patient was talkative and seemed able to stay on topic. He did talk quickly and did not allow this writer to interject. At times he was confused and forgot about previous conversations in the shift. 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 Patient rated pain at 7 of 10 when he received his scheduled naprosyn. Later, at HS, patient complained of pain in his lower extremities and received 650mg tylenol before he went to bed. He also reported he slept very poorly last night. He stated the atarax didn't help so he agreed to try 10mg Zyprexa. He went to bed after this and read a book. Patient is grandiose at times. He denied anxiety, depression, HI/SI and hallucinations. Patient did not shower.      Problem: Thought Process Alteration (Adult)  Goal: Improved Thought Process  Patient will have reality based conversation by discharge.   Outcome: Improving  Progress made towards goals.

## 2018-04-22 PROCEDURE — 99232 SBSQ HOSP IP/OBS MODERATE 35: CPT | Performed by: NURSE PRACTITIONER

## 2018-04-22 PROCEDURE — 25000132 ZZH RX MED GY IP 250 OP 250 PS 637: Performed by: NURSE PRACTITIONER

## 2018-04-22 PROCEDURE — 12400000 ZZH R&B MH

## 2018-04-22 RX ADMIN — LAMOTRIGINE 50 MG: 25 TABLET ORAL at 08:04

## 2018-04-22 RX ADMIN — CLONAZEPAM 0.5 MG: 0.5 TABLET ORAL at 20:33

## 2018-04-22 RX ADMIN — DIVALPROEX SODIUM 1000 MG: 500 TABLET, FILM COATED, EXTENDED RELEASE ORAL at 08:04

## 2018-04-22 RX ADMIN — OLANZAPINE 10 MG: 10 TABLET, FILM COATED ORAL at 17:39

## 2018-04-22 RX ADMIN — ACETAMINOPHEN 650 MG: 325 TABLET ORAL at 05:11

## 2018-04-22 RX ADMIN — NAPROXEN 500 MG: 500 TABLET ORAL at 08:03

## 2018-04-22 RX ADMIN — ATORVASTATIN CALCIUM 20 MG: 10 TABLET, FILM COATED ORAL at 08:04

## 2018-04-22 RX ADMIN — GABAPENTIN 300 MG: 300 CAPSULE ORAL at 08:04

## 2018-04-22 RX ADMIN — TRIAMTERENE AND HYDROCHLOROTHIAZIDE 1 CAPSULE: 37.5; 25 CAPSULE ORAL at 08:03

## 2018-04-22 RX ADMIN — NAPROXEN 500 MG: 500 TABLET ORAL at 17:23

## 2018-04-22 RX ADMIN — HYDROXYZINE HYDROCHLORIDE 25 MG: 25 TABLET ORAL at 08:13

## 2018-04-22 RX ADMIN — GABAPENTIN 300 MG: 300 CAPSULE ORAL at 20:34

## 2018-04-22 RX ADMIN — TRAZODONE HYDROCHLORIDE 100 MG: 100 TABLET ORAL at 20:33

## 2018-04-22 NOTE — PLAN OF CARE
Face to face end of shift report received from Marlene MO RN. Rounding completed. Patient observed.     Rhona Anguiano  4/22/2018  7:38 AM

## 2018-04-22 NOTE — PLAN OF CARE
"Problem: Patient Care Overview  Goal: Individualization & Mutuality  Pt will comply with treatment team recommendations  Pt will take medications as prescribed  Pt is wearing a fall wrist bracelet d/t unsteadiness upon rising from bed, remind pt of fall prevention   Pt okay to wear slippers due to BLE edema.   Outcome: Improving  Patient was calm and cooperative. He denied anxiety, depression, HI/SI and hallucinations. He was confused and asked the same questions he had earlier. He ate well. He had no complaints of pain but rated his pain at a 5 of 10 when taking scheduled naprosyn. Patient's med changes were reviewed with him. However, he said \"I don't pay close attention to my meds because it's hard for me to remember the changes.\" Patient was asked if anyone helps him with med set up and he said no. Patient was updated that he could possibly have the meds set up by his pharmacy and he seemed interested in this. He said he is willing to work with resources after discharge but has obstacles to transportation.     Problem: Thought Process Alteration (Adult)  Goal: Improved Thought Process  Patient will have reality based conversation by discharge.   Outcome: Improving  Patient is still confused.       "

## 2018-04-22 NOTE — PLAN OF CARE
Problem: Patient Care Overview  Goal: Individualization & Mutuality  Pt will comply with treatment team recommendations  Pt will take medications as prescribed  Pt is wearing a fall wrist bracelet d/t unsteadiness upon rising from bed, remind pt of fall prevention   Pt okay to wear slippers due to BLE edema.   Pt denies HI SI and hallucinations,  Has c/o pain and anxiety gave 6730.     1385:  Pt at table in [facility] - is tearful starts to sob as he talks about his son in law who  1.5 years ago. The writer  Empathized with pt and peer sitting at [facility] table attempted to console pt Was able to gain composure and started to eat his lunch.       Problem: Thought Process Alteration (Adult)  Goal: Improved Thought Process  Patient will have reality based conversation by discharge.   Disorganized thinking

## 2018-04-22 NOTE — PLAN OF CARE
Problem: Patient Care Overview  Goal: Individualization & Mutuality  Pt will comply with treatment team recommendations  Pt will take medications as prescribed  Pt is wearing a fall wrist bracelet d/t unsteadiness upon rising from bed, remind pt of fall prevention   Pt okay to wear slippers due to BLE edema.   Outcome: No Change  Slept all noc without issue - approx 7 - 8 hours - does have both lower limbs elevated.  Legs remain edemetous and left left discolored.

## 2018-04-22 NOTE — PROGRESS NOTES
"[facility]  Psychiatric Progress Note    Subjective         This is a 62 year old male with severe, mixed bipolar 1 w/ psychosis. \"Bear\"  This am is busy writing philosophy pertaining to his life.  Bear remains fairly easy to redirect, he again was informed he would would be placed on a 72 hold if her were to leave as he remains disorganized at times with delusions. Less occupied with leaving today.Pt remains difficult to follow due to his rambling pressured speech.        DIagnoses:    Bipolar 1 Disorder, most recent episode manic    Attestation:  Patient has been seen and evaluated by me,  WESLEY Tejada CNP          Interim History:   The patient's care was discussed with the treatment team and chart notes were reviewed.          Medications:       atorvastatin (LIPITOR) tablet 20 mg  20 mg Oral Daily     clonazePAM (klonoPIN) tablet 0.5 mg  0.5 mg Oral At Bedtime     divalproex sodium extended-release  1,000 mg Oral Daily     gabapentin  300 mg Oral BID     lamoTRIgine  50 mg Oral Daily     mineral oil-hydrophilic petrolatum   Topical Daily     naproxen  500 mg Oral BID w/meals     traZODone (DESYREL) tablet 100 mg  100 mg Oral At Bedtime     triamterene-hydrochlorothiazide  1 capsule Oral Daily     acetaminophen, alum & mag hydroxide-simethicone, bisacodyl, hydrOXYzine, hydrOXYzine, magnesium hydroxide, OLANZapine **OR** OLANZapine          Allergies:   No Known Allergies           10 point ROS positive for knee pain (bilateral), left hip pain, and low back pain.       Psychiatric Examination:   /78 (BP Location: Right arm)  Pulse 84  Temp 97.8  F (36.6  C) (Tympanic)  Resp 16  Wt 275 lb 12.8 oz (125.1 kg)  SpO2 98%  Weight is 275 lbs 12.8 oz  There is no height or weight on file to calculate BMI.    Appearance:  awake, alert, dressed in hospital scrubs and slightly unkempt  Attitude:  cooperative  Eye Contact:  good  Mood:  good  Affect:  mood congruent and intensity is " dramatic  Speech:  rambling going topic to topic  Psychomotor Behavior:  no evidence of tardive dyskinesia, dystonia, or tics and intact station, gait and muscle tone  Thought Process:  illogical & paranoid at times  Associations:  loosening of associations present  Thought Content:  no evidence of suicidal ideation or homicidal ideation, no auditory hallucinations present and no visual hallucinations present  Insight:  limited  Judgment:  limited  Oriented to:  time, person, and place  Attention Span and Concentration:  fair  Recent and Remote Memory:  intact  Fund of Knowledge: appropriate  Muscle Strength and Tone: normal  Gait and Station: Normal  Perception: appears internally stimulated at times         Labs:   No results found for this or any previous visit (from the past 24 hour(s)).          Assessment/ Plan:   Continue inpatient hospitalization.   Provide safe environment    ELOS: 3-5 days for stabilization of bipolar disorder along with observation of pts response to medications.    Pt was instructed on drug, dose, route, action, and anticipated outcome along with potential side effects. Pt verbalized understanding.

## 2018-04-22 NOTE — PLAN OF CARE
Face to face end of shift report received from Rhona LOBO RN. Rounding completed. Patient observed sitting in the lounge.     Javi Lutz  4/22/2018  3:51 PM

## 2018-04-22 NOTE — PLAN OF CARE
Face to face end of shift report received from JEO Estrada. Rounding completed. Patient observed. Lying in supine position - eyes closed - audible snoring -     Marlene Vasques  4/22/2018  1:00 AM           Health Maintenance Summary     Topic Due On Due Status Completed On Postpone Until Reason    MAMMOGRAM - BREAST CANCER SCREENING Dec 4, 2019 Not Due Dec 4, 2017      Colorectal Cancer Screening - Colonoscopy Nov 23, 2026 Not Due Nov 23, 2016      Osteoporosis Screening  Completed May 23, 2014      Immunization - Pneumococcal  Completed Nov 16, 2016      Immunization - TDAP Pregnancy  Hidden       Medicare Wellness Visit Sep 5, 2018 Due Soon Sep 5, 2017      IMMUNIZATION - DTaP/Tdap/Td [birth year] 10, 1969 Postponed  May 1, 2018 Patient Refused    Immunization-Influenza Sep 1, 2017 Overdue Nov 16, 2016      Hepatitis C Screening  Completed Jan 14, 2015            Patient is up to date, no discussion needed .      Unaddressed Risk Adjusted HCC Categories and Diagnoses  HCC 22 - Morbid Obesity   Unaddressed Dx:Morbid obesity due to excess calories (CMS/HCC)   - Chronic Ulcer of Skin, Except Pressure Ulcer   Unaddressed Dx:Skin ulcer of toe, right, limited to breakdown of skin (CMS/HCC)

## 2018-04-22 NOTE — PLAN OF CARE
Problem: Patient Care Overview  Goal: Individualization & Mutuality  Pt will comply with treatment team recommendations  Pt will take medications as prescribed  Pt is wearing a fall wrist bracelet d/t unsteadiness upon rising from bed, remind pt of fall prevention   Pt okay to wear slippers due to BLE edema.   Outcome: Improving  Slept most of the noc without issue - did get up once around 2 am and wrote in journal and notebook - also had book on '[employer]' from his local library - he continues tp be very involved in his  community.

## 2018-04-23 PROCEDURE — 12400000 ZZH R&B MH

## 2018-04-23 PROCEDURE — 25000132 ZZH RX MED GY IP 250 OP 250 PS 637: Performed by: NURSE PRACTITIONER

## 2018-04-23 PROCEDURE — 99232 SBSQ HOSP IP/OBS MODERATE 35: CPT | Performed by: NURSE PRACTITIONER

## 2018-04-23 RX ADMIN — ACETAMINOPHEN 650 MG: 325 TABLET ORAL at 06:24

## 2018-04-23 RX ADMIN — WHITE PETROLATUM: 1.75 OINTMENT TOPICAL at 08:21

## 2018-04-23 RX ADMIN — DIVALPROEX SODIUM 1000 MG: 500 TABLET, FILM COATED, EXTENDED RELEASE ORAL at 08:17

## 2018-04-23 RX ADMIN — TRAZODONE HYDROCHLORIDE 100 MG: 100 TABLET ORAL at 20:19

## 2018-04-23 RX ADMIN — NAPROXEN 500 MG: 500 TABLET ORAL at 16:43

## 2018-04-23 RX ADMIN — CLONAZEPAM 0.5 MG: 0.5 TABLET ORAL at 20:18

## 2018-04-23 RX ADMIN — HYDROXYZINE HYDROCHLORIDE 50 MG: 25 TABLET ORAL at 18:44

## 2018-04-23 RX ADMIN — ACETAMINOPHEN 650 MG: 325 TABLET ORAL at 10:32

## 2018-04-23 RX ADMIN — NAPROXEN 500 MG: 500 TABLET ORAL at 07:48

## 2018-04-23 RX ADMIN — GABAPENTIN 300 MG: 300 CAPSULE ORAL at 20:18

## 2018-04-23 RX ADMIN — GABAPENTIN 300 MG: 300 CAPSULE ORAL at 08:18

## 2018-04-23 RX ADMIN — TRIAMTERENE AND HYDROCHLOROTHIAZIDE 1 CAPSULE: 37.5; 25 CAPSULE ORAL at 08:18

## 2018-04-23 RX ADMIN — ATORVASTATIN CALCIUM 20 MG: 10 TABLET, FILM COATED ORAL at 08:18

## 2018-04-23 RX ADMIN — LAMOTRIGINE 50 MG: 25 TABLET ORAL at 08:18

## 2018-04-23 ASSESSMENT — ACTIVITIES OF DAILY LIVING (ADL)
ORAL_HYGIENE: INDEPENDENT
LAUNDRY: UNABLE TO COMPLETE
DRESS: INDEPENDENT
DRESS: SCRUBS (BEHAVIORAL HEALTH);INDEPENDENT
GROOMING: INDEPENDENT
GROOMING: INDEPENDENT
ORAL_HYGIENE: INDEPENDENT

## 2018-04-23 NOTE — PLAN OF CARE
Problem: Patient Care Overview  Goal: Individualization & Mutuality  Pt will comply with treatment team recommendations  Pt will take medications as prescribed  Pt is wearing a fall wrist bracelet d/t unsteadiness upon rising from bed, remind pt of fall prevention   Pt okay to wear slippers due to BLE edema.   Outcome: Improving  Slept all night without issue - administered tylenol 650 mg po per pt request - at 0626 for leg, knee pain - bilateral - he rates a seven  - will monitor for effectiveness.

## 2018-04-23 NOTE — PROGRESS NOTES
"[facility]  Psychiatric Progress Note      Impression:   Patient Дмитрий Fleming is a 62 year old male admitted on 4/17/2018 with severe mixed bipolar 1 disorder without psychosis.    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 States he has written to Yandel Serna about his starring role in movie [employer] but \"haven't heard back\". Stated he \"offered to give him some of my artwork\". He shows slight hand tremor to this provider, unchanged from admission. States focus and concentration is \"about a half hour\" when \"reading my research books\". Denies auditory or visual hallucinations. Appetite is good. Attending some groups. Illogical and grandiose in conversation.  States he continues to be \"very productive\" and has shown minimal improvement with Depakote. Will consider augmentation or dose adjustment. Educated regarding medication indications, risks, benefits, side effects, contraindications and possible interactions. Verbally expressed understanding.          Diagnoses:   Bipolar Disorder 1, most recent episode manic    Attestation:  Patient has been seen and evaluated by me,  WESLEY Gamboa CNP          Interim History:   The patient's care was discussed with the treatment team and chart notes were reviewed.          Medications:     No current outpatient prescriptions on file.      10 point ROS positive for knee pain, left hip pain and low back pain       Allergies:   No Known Allergies         Psychiatric Examination:   /77  Pulse 96  Temp 98.3  F (36.8  C) (Tympanic)  Resp 16  Wt 125.1 kg (275 lb 12.8 oz)  SpO2 96%  Weight is 275 lbs 12.8 oz  There is no height or weight on file to calculate BMI.    Appearance:  awake, alert  Attitude:  cooperative  Eye Contact:  good  Mood:  anxious  Affect:  mood congruent  Speech:  clear, coherent  Psychomotor Behavior:  no evidence of tardive dyskinesia, dystonia, or tics, tremor observed  and was " present at intake  Thought Process:  illogical and paranoid  Associations:  loosening of associations present  Thought Content:  no evidence of suicidal ideation or homicidal ideation, no auditory hallucinations present, no visual hallucinations present, obsessions present and paranoid thoughts  Insight:  limited  Judgment:  limited  Oriented to:  time, person, and place  Attention Span and Concentration:  fair  Recent and Remote Memory:  intact  Fund of Knowledge: appropriate  Muscle Strength and Tone: normal  Gait and Station: Normal         Labs:   No results found for this or any previous visit.         Plan:   Continue Inpatient Hospitalization  Continue to provide a safe environment and therapeutic milieu  Continue medications  Obtain Depakote level and Hepatic panel  Consider increasing Depakote or augmenting to stabilize mood    ELOS: 3-5 days for stabilization of bipolar disorder and observation of new medication tolerance and efficacy

## 2018-04-23 NOTE — PLAN OF CARE
Face to face end of shift report received from Marlene MO RN. Rounding completed. Patient observed in [facility].     Mel Bonilla  4/23/2018  8:30 AM

## 2018-04-23 NOTE — PLAN OF CARE
Face to face end of shift report received from Carlitos IGLESIAS RN. Rounding completed. Patient observed sitting in day room with peers. No requests at this time.     Marcelina Araiza  4/23/2018  5:15 PM

## 2018-04-23 NOTE — PLAN OF CARE
Face to face end of shift report received from JOE Estrada. Rounding completed. Patient observed. Lying in supine position - eyes closed - audible snoring.     Marlene Vasques  4/23/2018  12:32 AM

## 2018-04-23 NOTE — PLAN OF CARE
Problem: Patient Care Overview  Goal: Individualization & Mutuality  Pt will comply with treatment team recommendations  Pt will take medications as prescribed  Pt is wearing a fall wrist bracelet d/t unsteadiness upon rising from bed, remind pt of fall prevention   Pt okay to wear slippers due to BLE edema.   Outcome: No Change  Pt has been spending time out in the [facility] area attending groups and socializing with peers. He denied having any anxiety, Hi, SI, and hallucinations. He did report having some depression today. He has a full range affect and mood is calm. He appears disheveled and neglecting grooming/self care. Pt has remained appropriate with his slippers and has been free from falls at this time. Will continue to monitor.     Problem: Thought Process Alteration (Adult)  Goal: Improved Thought Process  Patient will have reality based conversation by discharge.   Outcome: No Change  Pt is able to carry on short reality based conversations.

## 2018-04-23 NOTE — PLAN OF CARE
"Problem: Patient Care Overview  Goal: Individualization & Mutuality  Pt will comply with treatment team recommendations  Pt will take medications as prescribed  Pt is wearing a fall wrist bracelet d/t unsteadiness upon rising from bed, remind pt of fall prevention   Pt okay to wear slippers due to BLE edema.   Outcome: Improving  Patient denied depression, anxiety, HI/SI and hallucinations but he was tearful during conversation. He told stories about his family and then with tears in his eyes said \"It's good to cry.\" Patient talked slowly but rambled. At times he let this writer talk but then would change the subject before this writer was done with a story or idea. He said he slept well last night. He did not shower. 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 He reported that his feet/heels hurt and rated the pain at 7 of 10 when he received his scheduled naprosyn. He complained of constipation and requested a PRN. He received Milk of Mag and was prompted to drink a large amount of water.     Problem: Thought Process Alteration (Adult)  Goal: Improved Thought Process  Patient will have reality based conversation by discharge.   Outcome: Improving  Improvement made towards goal.       "

## 2018-04-23 NOTE — PLAN OF CARE
Problem: Patient Care Overview  Goal: Team Discussion  Team Plan:   BEHAVIORAL TEAM DISCUSSION    Participants: Jerry Gooden NP, Kaci Santana NP,  Shy LopezOhio[facility] HospitalSW, Aleshia CRAWFORD, Rafael Thacker. Santa Peacock OT, Sarai Foy OT, Janneth Morse SW   Progress: Minimal: still hypomanic - paranoid, conspiracy theories about Charly Roberts, confuses self in conversation, grandiose   Continued Stay Criteria/Rationale: Increase Gabapentin, Dyazide - monitor meds - is taking meds  Medical/Physical: Pain in left hip, back and knee.  Edema in both legs.  Precautions:   Behavioral Orders   Procedures     Code 1 - Restrict to Unit     Routine Programming     As clinically indicated     Self Injury Precaution     Status 15     Every 15 minutes.     Plan: Stabilize and discharge home with services - place on a hold if he demands to leave as he is not safe to leave at this time.  Rationale for change in precautions or plan: none

## 2018-04-24 LAB
ALBUMIN SERPL-MCNC: 4.5 G/DL (ref 3.4–5)
ALP SERPL-CCNC: 79 U/L (ref 40–150)
ALT SERPL W P-5'-P-CCNC: 26 U/L (ref 0–70)
AST SERPL W P-5'-P-CCNC: 22 U/L (ref 0–45)
BILIRUB DIRECT SERPL-MCNC: 0.1 MG/DL (ref 0–0.2)
BILIRUB SERPL-MCNC: 0.7 MG/DL (ref 0.2–1.3)
PROT SERPL-MCNC: 7.6 G/DL (ref 6.8–8.8)
VALPROATE SERPL-MCNC: 61 MG/L (ref 50–100)

## 2018-04-24 PROCEDURE — 80076 HEPATIC FUNCTION PANEL: CPT | Performed by: NURSE PRACTITIONER

## 2018-04-24 PROCEDURE — 99232 SBSQ HOSP IP/OBS MODERATE 35: CPT | Performed by: NURSE PRACTITIONER

## 2018-04-24 PROCEDURE — 80164 ASSAY DIPROPYLACETIC ACD TOT: CPT | Performed by: NURSE PRACTITIONER

## 2018-04-24 PROCEDURE — 25000132 ZZH RX MED GY IP 250 OP 250 PS 637: Performed by: NURSE PRACTITIONER

## 2018-04-24 PROCEDURE — 12400000 ZZH R&B MH

## 2018-04-24 PROCEDURE — 36415 COLL VENOUS BLD VENIPUNCTURE: CPT | Performed by: NURSE PRACTITIONER

## 2018-04-24 RX ORDER — DIVALPROEX SODIUM 250 MG/1
250 TABLET, EXTENDED RELEASE ORAL DAILY
Status: DISCONTINUED | OUTPATIENT
Start: 2018-04-24 | End: 2018-04-25 | Stop reason: HOSPADM

## 2018-04-24 RX ADMIN — ACETAMINOPHEN 650 MG: 325 TABLET ORAL at 08:04

## 2018-04-24 RX ADMIN — DIVALPROEX SODIUM 250 MG: 250 TABLET, FILM COATED, EXTENDED RELEASE ORAL at 16:15

## 2018-04-24 RX ADMIN — CLONAZEPAM 0.5 MG: 0.5 TABLET ORAL at 20:17

## 2018-04-24 RX ADMIN — TRIAMTERENE AND HYDROCHLOROTHIAZIDE 1 CAPSULE: 37.5; 25 CAPSULE ORAL at 08:02

## 2018-04-24 RX ADMIN — ACETAMINOPHEN 650 MG: 325 TABLET ORAL at 17:56

## 2018-04-24 RX ADMIN — HYDROXYZINE HYDROCHLORIDE 50 MG: 25 TABLET ORAL at 00:21

## 2018-04-24 RX ADMIN — NAPROXEN 500 MG: 500 TABLET ORAL at 08:02

## 2018-04-24 RX ADMIN — TRAZODONE HYDROCHLORIDE 100 MG: 100 TABLET ORAL at 20:17

## 2018-04-24 RX ADMIN — WHITE PETROLATUM: 1.75 OINTMENT TOPICAL at 08:11

## 2018-04-24 RX ADMIN — NAPROXEN 500 MG: 500 TABLET ORAL at 16:15

## 2018-04-24 RX ADMIN — ATORVASTATIN CALCIUM 20 MG: 10 TABLET, FILM COATED ORAL at 08:02

## 2018-04-24 RX ADMIN — GABAPENTIN 300 MG: 300 CAPSULE ORAL at 08:03

## 2018-04-24 RX ADMIN — DIVALPROEX SODIUM 1000 MG: 500 TABLET, FILM COATED, EXTENDED RELEASE ORAL at 08:03

## 2018-04-24 ASSESSMENT — ACTIVITIES OF DAILY LIVING (ADL)
DRESS: INDEPENDENT
GROOMING: INDEPENDENT
ORAL_HYGIENE: INDEPENDENT
LAUNDRY: UNABLE TO COMPLETE

## 2018-04-24 NOTE — PLAN OF CARE
Problem: Patient Care Overview  Goal: Individualization & Mutuality  Pt will comply with treatment team recommendations  Pt will take medications as prescribed  Pt is wearing a fall wrist bracelet d/t unsteadiness upon rising from bed, remind pt of fall prevention   Pt okay to wear slippers due to BLE edema.   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  Pt does have tangential thinking though is able to be guided back to the topic at hand. He complained of having itching on both legs where there is swelling.  Pt was given some lotion which he said helped. He is wondering if he can be prescribed something for the itching.  He showered this evening as well.  Pt complained of anxiety and was given Atarax at 1844. He reported that it helped some.  He asked about his discharge plan and hopes that he will be able to go home soon.  He denies feeling suicidal or depressed. He says that he is less manic than when he arrived.      2254 Pt given an extra pillow under his legs to help reduce the edema.    Problem: Depression (Adult,Obstetrics,Pediatric)  Goal: Establish/Maintain Self-Care Routine  Patient will demonstrate the desired outcomes by discharge/transition of care.   Outcome: Improving  Pt showered this evening  Goal: Improved/Stable Mood  Patient will demonstrate the desired outcomes by discharge/transition of care.   Outcome: Improving  Pt's denies depression    Problem: Thought Process Alteration (Adult)  Goal: Improved Thought Process  Patient will have reality based conversation by discharge.   Outcome: Improving  Pt is able to have reality based conversation though is still somewhat tangential.

## 2018-04-24 NOTE — PLAN OF CARE
Patient asked for a [employer] number. I let the patient know that I gave him the number yesterday when we looked through his belongings together. Pt did not remember this and asked me to find the number for his insurance again.

## 2018-04-24 NOTE — PLAN OF CARE
Face to face end of shift report received from Mel DIAZ. Rounding completed. Patient observed in group.    Julio Barreto  4/24/2018  3:27 PM

## 2018-04-24 NOTE — PLAN OF CARE
Face to face end of shift report received from Julio MEIJA RN. Rounding completed. Patient observed.     Tameka Fulton  4/24/2018  12:00 AM

## 2018-04-24 NOTE — PLAN OF CARE
Problem: Patient Care Overview  Goal: Team Discussion  Team Plan:   BEHAVIORAL TEAM DISCUSSION    Participants: Jerry Gooden NP, Kaci Santana NP, Shy GIORDAONSW, Aleshia TAMAYOW, Rafael Rhodes RN. [facility], Santa Peacock OT, Sarai Foy OT  Progress: Minimal, going to groups   Continued Stay Criteria/Rationale: increase in Depakote   Medical/Physical: Edema in legs   Precautions:   Behavioral Orders   Procedures     Code 1 - Restrict to Unit     Routine Programming     As clinically indicated     Status 15     Every 15 minutes.     Plan: D/C home with [facility] place on hold if tries to leave, unsafe to discharge at this time   Rationale for change in precautions or plan: none

## 2018-04-24 NOTE — PLAN OF CARE
"Problem: Patient Care Overview  Goal: Individualization & Mutuality  Pt will comply with treatment team recommendations  Pt will take medications as prescribed  Pt is wearing a fall wrist bracelet d/t unsteadiness upon rising from bed, remind pt of fall prevention   Pt okay to wear slippers due to BLE edema.   Outcome: Improving  Patient has taken medications as prescribed.  Patient has not had any falls this shift.   Patient has followed treatment team recommendations this shift.     Patient has been calm, cooperative this shift. He has been socializing more with peers this shift. States that he feels tired, but knows he slept well. Is questioning whether or not he is able to discharge today. Encouraged to discuss with provider/. He denies SI, HI, and hallucinations. States that he is having 5/10 pain due to his LE edema. Reported his left leg is more painful than his right. It doesn't appear as red today. He said \"Well, I stopped itching it so much\". Pt seems to be having difficulty remembering this writer today. Every time we talk he asks me who I am, if I just came into work. Pt has sat out in the lounge throughout the day reading. Will continue to monitor.     Problem: Depression (Adult,Obstetrics,Pediatric)  Goal: Establish/Maintain Self-Care Routine  Patient will demonstrate the desired outcomes by discharge/transition of care.   Outcome: Improving  Patient has not completed ADLs this shift.   Goal: Improved/Stable Mood  Patient will demonstrate the desired outcomes by discharge/transition of care.   Outcome: Improving  Patient reports feeling better today, appears to have a full affect.     Problem: Thought Process Alteration (Adult)  Goal: Improved Thought Process  Patient will have reality based conversation by discharge.   Outcome: Improving  Patient is able to have a reality based conversation this shift.       "

## 2018-04-24 NOTE — PLAN OF CARE
Problem: Patient Care Overview  Goal: Individualization & Mutuality  Pt will comply with treatment team recommendations  Pt will take medications as prescribed  Pt is wearing a fall wrist bracelet d/t unsteadiness upon rising from bed, remind pt of fall prevention   Pt okay to wear slippers due to BLE edema.   Outcome: No Change  Pt has been in bed with eyes closed and regular respirations X 6 hours. 15 minute and PRN checks all night. Pt. Legs edematous, dark pink and warm to touch. Pt. C/o bilateral burning leg pain. Pt. Encouraged to elevate his legs. Pt. Compliant. Pt. C/o not being able to sleep causing anxiety. PRN atarax 50 mg po administered @ 0021 per pt. Request with effective results. Will continue to monitor.      Tameka Fulton  4/24/2018  1:37 AM

## 2018-04-24 NOTE — PROGRESS NOTES
"[facility]  Psychiatric Progress Note      Impression:   Patient Дмитрий Fleming is a 62 year old male admitted on 4/17/2018 with severe mixed bipolar 1 disorder without psychosis.    Patient interviewed today and pleasant and cooperative. Reports sleeping well \"guess I snore\". Stated he would like to go home soon. Yesterday had plans to move apartments, today is more logical, stating \"eventually\" would need bigger place so can move belongings out of rented storage locker that he pays for.  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 He stated slight hand tremor he had yesterday is now gone and again contributes it to \"get that when I start new medications\".  Patient is continuing lamictal taper and tolerating well. Will discontinue gabapentin due to possible side effect of increased lower extremity edema. States focus and concentration is improved \"read about 50 pages last night in my challenging book\".  Denies auditory or visual hallucinations. Appetite is good. Attending some groups. Depakote level reviewed and will titrate up to 1250mg. Educated regarding medication indications, risks, benefits, side effects, contraindications and possible interactions. Verbally expressed understanding.          Diagnoses:   Bipolar Disorder 1, most recent episode manic    Attestation:  Patient has been seen and evaluated by me,  WESLEY Gamboa CNP          Interim History:   The patient's care was discussed with the treatment team and chart notes were reviewed.          Medications:     Current Facility-Administered Medications   Medication     acetaminophen (TYLENOL) tablet 650 mg     alum & mag hydroxide-simethicone (MYLANTA ES/MAALOX  ES) suspension 30 mL     atorvastatin (LIPITOR) tablet 20 mg     bisacodyl (DULCOLAX) Suppository 10 mg     clonazePAM (klonoPIN) tablet 0.5 mg     divalproex sodium extended-release " (DEPAKOTE ER) 24 hr tablet 1,000 mg     divalproex sodium extended-release (DEPAKOTE ER) 24 hr tablet 250 mg     hydrOXYzine (ATARAX) tablet 25-50 mg     hydrOXYzine (ATARAX) tablet 50 mg     magnesium hydroxide (MILK OF MAGNESIA) suspension 30 mL     mineral oil-hydrophilic petrolatum (AQUAPHOR)     naproxen (NAPROSYN) tablet 500 mg     OLANZapine (zyPREXA) tablet 10 mg    Or     OLANZapine (zyPREXA) injection 10 mg     traZODone (DESYREL) tablet 100 mg     triamterene-hydrochlorothiazide (DYAZIDE) 37.5-25 MG per capsule 1 capsule      10 point ROS positive for knee pain, left hip pain and low back pain       Allergies:   No Known Allergies         Psychiatric Examination:   /88  Pulse 72  Temp 96.9  F (36.1  C) (Tympanic)  Resp 18  Wt 125.1 kg (275 lb 12.8 oz)  SpO2 98%  Weight is 275 lbs 12.8 oz  There is no height or weight on file to calculate BMI.    Appearance:  awake, alert  Attitude:  cooperative  Eye Contact:  good  Mood:  anxious  Affect:  mood congruent  Speech:  clear, coherent  Psychomotor Behavior:  no evidence of tardive dyskinesia, dystonia, or tics, tremor observed  and was present at intake  Thought Process:  illogical and paranoid  Associations:  loosening of associations present  Thought Content:  no evidence of suicidal ideation or homicidal ideation, no auditory hallucinations present, no visual hallucinations present, obsessions present and paranoid thoughts  Insight:  limited  Judgment:  limited  Oriented to:  time, person, and place  Attention Span and Concentration:  fair  Recent and Remote Memory:  intact  Fund of Knowledge: appropriate  Muscle Strength and Tone: normal  Gait and Station: Normal         Labs:     Results for orders placed or performed during the hospital encounter of 04/17/18   Valproic acid   Result Value Ref Range    Valproic Acid Level 61 50 - 100 mg/L   Hepatic panel   Result Value Ref Range    Bilirubin Direct 0.1 0.0 - 0.2 mg/dL    Bilirubin Total 0.7 0.2  - 1.3 mg/dL    Albumin 4.5 3.4 - 5.0 g/dL    Protein Total 7.6 6.8 - 8.8 g/dL    Alkaline Phosphatase 79 40 - 150 U/L    ALT 26 0 - 70 U/L    AST 22 0 - 45 U/L            Plan:   Continue Inpatient Hospitalization  Continue to provide a safe environment and therapeutic milieu  Discontinue gabapentin as may be increasing preexisting edema.  Patient was seen for edema over weekend by provider. Consider Hospitalist Consult  Increase Depakote two 1250 mg as valproic lab level low   Will need repeat lab and monitoring of lower extremity edema    ELOS: 3-5 days for stabilization of bipolar disorder and observation of new medication tolerance and efficacy

## 2018-04-24 NOTE — PLAN OF CARE
Face to face end of shift report received from Tameka WILSON RN. Rounding completed. Patient observed in [facility]     Mel Bonilla  4/24/2018  7:45 AM

## 2018-04-25 VITALS
OXYGEN SATURATION: 97 % | SYSTOLIC BLOOD PRESSURE: 128 MMHG | DIASTOLIC BLOOD PRESSURE: 73 MMHG | TEMPERATURE: 97.1 F | HEART RATE: 84 BPM | RESPIRATION RATE: 16 BRPM | WEIGHT: 275.8 LBS

## 2018-04-25 PROCEDURE — 25000132 ZZH RX MED GY IP 250 OP 250 PS 637: Performed by: NURSE PRACTITIONER

## 2018-04-25 PROCEDURE — 99238 HOSP IP/OBS DSCHRG MGMT 30/<: CPT | Performed by: NURSE PRACTITIONER

## 2018-04-25 RX ORDER — DIVALPROEX SODIUM 250 MG/1
250 TABLET, EXTENDED RELEASE ORAL DAILY
Qty: 30 TABLET | Refills: 0 | Status: ON HOLD | OUTPATIENT
Start: 2018-04-26 | End: 2019-06-04

## 2018-04-25 RX ORDER — TRIAMTERENE AND HYDROCHLOROTHIAZIDE 37.5; 25 MG/1; MG/1
1 CAPSULE ORAL DAILY
Qty: 30 CAPSULE | Refills: 0 | Status: ON HOLD | OUTPATIENT
Start: 2018-04-26 | End: 2019-06-04

## 2018-04-25 RX ORDER — DIVALPROEX SODIUM 500 MG/1
1000 TABLET, EXTENDED RELEASE ORAL DAILY
Qty: 60 TABLET | Refills: 0 | Status: ON HOLD | OUTPATIENT
Start: 2018-04-26 | End: 2019-06-04

## 2018-04-25 RX ADMIN — DIVALPROEX SODIUM 250 MG: 250 TABLET, FILM COATED, EXTENDED RELEASE ORAL at 08:15

## 2018-04-25 RX ADMIN — DIVALPROEX SODIUM 1000 MG: 500 TABLET, FILM COATED, EXTENDED RELEASE ORAL at 08:18

## 2018-04-25 RX ADMIN — TRIAMTERENE AND HYDROCHLOROTHIAZIDE 1 CAPSULE: 37.5; 25 CAPSULE ORAL at 08:15

## 2018-04-25 RX ADMIN — NAPROXEN 500 MG: 500 TABLET ORAL at 08:15

## 2018-04-25 RX ADMIN — ATORVASTATIN CALCIUM 20 MG: 10 TABLET, FILM COATED ORAL at 08:15

## 2018-04-25 RX ADMIN — WHITE PETROLATUM: 1.75 OINTMENT TOPICAL at 08:22

## 2018-04-25 ASSESSMENT — ACTIVITIES OF DAILY LIVING (ADL)
DRESS: INDEPENDENT;SCRUBS (BEHAVIORAL HEALTH)
ORAL_HYGIENE: INDEPENDENT
GROOMING: INDEPENDENT
LAUNDRY: UNABLE TO COMPLETE

## 2018-04-25 NOTE — PLAN OF CARE
Face to face end of shift report received from JOE Kim. Rounding completed. Patient observed resting in bed.     LYLY DUGAN  4/25/2018  1:34 AM

## 2018-04-25 NOTE — PLAN OF CARE
Problem: Patient Care Overview  Goal: Individualization & Mutuality  Pt will comply with treatment team recommendations  Pt will take medications as prescribed  Pt is wearing a fall wrist bracelet d/t unsteadiness upon rising from bed, remind pt of fall prevention   Pt okay to wear slippers due to BLE edema.   Pt appears to be sleeping in bed with eyes closed, having regular respirations and position changes. 15 minutes and PRN safety checks completed with no noted complains. Pt free from falls so far this shift.  Will continue to monitor.         Problem: Depression (Adult,Obstetrics,Pediatric)  Goal: Establish/Maintain Self-Care Routine  Patient will demonstrate the desired outcomes by discharge/transition of care.   Pt appears to be sleeping in bed with eyes closed, having regular respirations and position changes. 15 minutes and PRN safety checks completed with no noted complains. Pt free from falls so far this shift.  Will continue to monitor.

## 2018-04-25 NOTE — PROGRESS NOTES
Discharge Note    Patient Discharged to home on 4/25/2018 2:03 PM via Taxi accompanied by staff to taxi.     Patient informed of discharge instructions in AVS. patient verbalizes understanding and denies having any questions pertaining to AVS. Patient stable at time of discharge. Patient denies SI, HI, and thoughts of self harm at time of discharge. All personal belongings returned to patient. Discharge prescriptions sent to [facility] in [place] via electronic communication. Psych evaluation, history and physical, AVS, and discharge summary faxed to next level of care per .     Rafael Rhodes  4/25/2018  2:03 PM

## 2018-04-25 NOTE — PLAN OF CARE
Problem: Patient Care Overview  Goal: Individualization & Mutuality  Pt will comply with treatment team recommendations  Pt will take medications as prescribed  Pt is wearing a fall wrist bracelet d/t unsteadiness upon rising from bed, remind pt of fall prevention   Pt okay to wear slippers due to BLE edema.   Outcome: Therapy, progress toward functional goals is gradual  Pt is treatment recommendation and medication compliant. He is wearing his fall risk band. Pt is very polite and courteous to writer and loves to tell stories about his history. Pt denies all criteria but did voice that he is agitated that he is still here past the typical hold time. He has been writing letters this morning with no other complaints other than being ready to leave. Pt denies need for prn for agitation. Will continue to monitor.     Problem: Overarching Goals (Adult)  Goal: Adheres to Safety Considerations for Self and Others  Outcome: Therapy, progress towards functional goals is fair  Pt is safe with himself and peers on the unit.     Problem: Depression (Adult,Obstetrics,Pediatric)  Goal: Identify Related Risk Factors and Signs and Symptoms  Related risk factors and signs and symptoms are identified upon initiation of Human Response Clinical Practice Guideline (CPG).   Pt is improving daily     Problem: Thought Process Alteration (Adult)  Goal: Improved Thought Process  Patient will have reality based conversation by discharge.   Outcome: Therapy, progress toward functional goals is gradual  Pt is able to maintain reality based conversations with writer for long periods of time with no issue. He is also tracking his conversations very well at this time.

## 2018-04-25 NOTE — PLAN OF CARE
Insurance ride was set up ride with Andrea Quinonez for today @2 and bringing him home. [phone]

## 2018-04-25 NOTE — PLAN OF CARE
Face to face end of shift report received from Juliet Gonzalez completed. Patient observed sitting in lounge journaling.     Sarita Blas  4/25/2018  7:53 AM

## 2018-04-25 NOTE — PLAN OF CARE
Pt is discharging at the recommendation of the treatment team. Pt is discharging home transported by taxi. Pt denies having any thoughts of hurting themself or anyone else. Pt denies anxiety or depression. Pt has follow up with [facility], therapist, PCP. Discharge instructions, including; demographic sheet, psychiatric evaluation, discharge summary, and AVS were faxed to these next level of care providers.

## 2018-04-25 NOTE — PLAN OF CARE
Problem: Patient Care Overview  Goal: Team Discussion  Team Plan:   BEHAVIORAL TEAM DISCUSSION    Participants: Sarai Bell NP,  Modesta Sotelo NP, Alesiha CRAWFORD, Kayleigh Moran RN, Rafael Rhodes RN. [facility], Santa Peacock OT, Sarai Foy OT  Progress: Better and going to groups  Continued Stay Criteria/Rationale: continue medications   Medical/Physical: Edema   Precautions:   Behavioral Orders   Procedures     Code 1 - Restrict to Unit     Routine Programming     As clinically indicated     Status 15     Every 15 minutes.     Plan: Possibly D/C home with [facility], therapy and Medication Management today or tomorrow   Rationale for change in precautions or plan: none

## 2018-04-25 NOTE — PLAN OF CARE
Problem: Patient Care Overview  Goal: Individualization & Mutuality  Pt will comply with treatment team recommendations  Pt will take medications as prescribed  Pt is wearing a fall wrist bracelet d/t unsteadiness upon rising from bed, remind pt of fall prevention   Pt okay to wear slippers due to BLE edema.   Outcome: Improving  Pt has been up and active this shift. He attended all of the available groups and is social with peers.  Pt has been writing often and continues to talk about the history topics that he is reading about.  He does have tangential thinking with some grandiosity. He told me that he hopes that he will be discharged soon.  He denies having suicidal thoughts, depression, or manic symptoms. He has received Tylenol for his leg pain this evening along with the scheduled Naproxen with moderate pain relief. He plans to elevate his legs tonight on the extra two pillows.    Problem: Depression (Adult,Obstetrics,Pediatric)  Goal: Establish/Maintain Self-Care Routine  Patient will demonstrate the desired outcomes by discharge/transition of care.   Outcome: Improving  Pt's hygiene has improved  Goal: Improved/Stable Mood  Patient will demonstrate the desired outcomes by discharge/transition of care.   Outcome: Improving  Pt denies depression    Problem: Thought Process Alteration (Adult)  Goal: Improved Thought Process  Patient will have reality based conversation by discharge.   Outcome: Improving  Pt's thought process is reality based this evening but he still has tangential thinking

## 2018-04-25 NOTE — DISCHARGE INSTRUCTIONS
Behavioral Discharge Planning and Instructions    Summary: Дмитрий was admitted to  due to manic symptoms and bipolar     Main Diagnosis: Bipolar Disorder 1, most recent episode manic    Major Treatments, Procedures and Findings: Stabilize with medications, connect with community programs.    Symptoms to Report: feeling more aggressive, increased confusion, losing more sleep, mood getting worse or thoughts of suicide    Lifestyle Adjustment: Take all medications as prescribed, meet with doctor/ medication provider, out patient therapist, , and [facility] worker as scheduled. Abstain from alcohol or any unprescribed drugs.      Psychiatry Follow-up:     [facility] will be at your home and will call you 24hrs before apt.   [facility]- ADRIANA Guerrero 5/23 @3   Miriam Bernal 6/14 @2  Phone: [phone]  Fax: [phone]    [facility]   PCP- Aris- 5/7 @4  [address]  [address]  [place] [address]  P: [phone]  F: [phone]    [facility]   Therapy- 4/30 @12:45 w/Ned Brunson   [address] [place] [address]   P: [phone]  F:[phone]    Resources:   Crisis Intervention: 256.344.6249 or 739-920-0537 (TTY: 782.337.4964).  Call anytime for help.  National [place] on Mental Illness (www.mn.immanuel.org): 770.222.6183 or 360-101-5961.  Alcoholics Anonymous (www.alcoholics-anonymous.org): Check your phone book for your local chapter.  Suicide Awareness Voices of Education (SAVE) (www.save.org): 704-675-YPXK (9748)  National Suicide Prevention Line (www.mentalhealthmn.org): 278-626-ZVEP (9859)  Mental Health Consumer/Survivor Network of [place] (www.mhcsn.net): 282.224.2533 or 714-274-1327    General Medication Instructions:   See your medication sheet(s) for instructions.   Take all medicines as directed.  Make no changes unless your doctor suggests them.   Go to all your doctor visits.  Be sure to have all your required lab tests. This way, your medicines  can be refilled on time.  Do not use any drugs not prescribed by your doctor.  Avoid alcohol.    [place] Area:  [facility], Crisis stabilization [facility]- [phone]  [facility] Crisis Line: 1-309.406.9128  Advocates For Family Peace: 493-3458  Sexual Assault Program of [facility]: 711.678.8207 or 1-872.910.3254  [place] Battered Women's Program: 1-366.148.8762 Ext: 279       Calls answered Mon-Fri-8:00 am--4:30 pm    [place]:  Advocates for Family Peace: 1-537-242-2703  [facility] first call for help: 5-382-464-7549  [facility]:  (458) 706-4964      [place] Area:  Warm Line: 1-619.495.6176       Calls answered Tuesday--Saturday 4:00 pm--10:00 pm  [place] Crisis Line - 331.559.6782  [facility] Crisis Stabilization 393-078-4826    [place] Statewide:  [place] Crisis and Referral Services: 1-865.411.2235  National Suicide Prevention Lifeline: 6-817-728-TALK (5671)   - svu6vvbm- Text  Life  to 40260  First Call for Help: 2-1-1  [facility] Helpline- 6-875-LLAY-HELP

## 2018-04-25 NOTE — DISCHARGE SUMMARY
"[facility]    Discharge Summary  Adult Psychiatry    Date of Admission:  4/17/2018  Date of Discharge:  4/25/2018  Discharging Provider: Sarai Bell    Discharge Diagnoses   Principal Discharge Diagnosis: Bipolar I Disorder, recurrent, Most recent manic  Secondary Discharge Diagnosis: Anxiety Disorder, moderate  Medical Diagnoses addressed this admission: Edema    History of Present Illness   Дмитрий Fleming is a 62 year old  male who presented via [facility] ED in [place], [place] with symptoms of hyperverbality, emotional lability, anxiety, pressured and rapid speech. Patient states he has had increased productivity including writing a book, decreased sleep, increased walking, weight loss of 25 pounds over past month and illogical thoughts. He attributes his decompensation to recent medication change to Lamictal and reports previously had done well on Depakote. Psychiatric provider not longer available in [place], [place]. Hospitalization is needed to stabilize patient, promote healthy sleeping and eating schedules, taper off Lamictal and start Depakote for hypomanic symptoms of bipolar. Will continue clonazepam as has been on \"for years\" and helpful in decreasing hypomanic symptoms.  He is voluntary and willing to stay. Plan to decrease trazodone as may be contributing to dizziness and edema. Educated regarding medication indications, risks, benefits, side effects, contraindications and possible interactions. Verbally expressed understanding.     Hospital Course   Patient was able to stabilize mood with the addition of depakote and discontinue lamictal and trazodone.  He remains pleasant and appropriate while on the unit, improved sleep, and overall less manic then upon admission.  Patient has outpatient appointment scheduled for medication management, therapy, [facility] and primary care.  He denies suicidal or homicidal ideation, plan or intent and is assessed at low risk of harm to " self or others at discharge.      Sarai Bell    Significant Results and Procedures   No results found for this or any previous visit (from the past 24 hour(s)).      Pending Results   None    Unresulted Labs Ordered in the Past 30 Days of this Admission     No orders found from 2/16/2018 to 4/18/2018.          Code Status   Full Code    Primary Care Physician   Marvin Hurst    Physical Exam   Appearance:  awake, alert  Attitude:  cooperative  Eye Contact:  good  Mood:  better  Affect:  appropriate and in normal range  Speech:  clear, coherent  Psychomotor Behavior:  no evidence of tardive dyskinesia, dystonia, or tics  Thought Process:  logical and linear  Associations:  no loose associations  Thought Content:  no evidence of suicidal ideation or homicidal ideation and no evidence of psychotic thought  Insight:  fair  Judgment:  intact  Oriented to:  time, person, and place  Attention Span and Concentration:  intact  Recent and Remote Memory:  intact  Language: Able to name objects, Able to repeat phrases and Able to read and write  Fund of Knowledge: appropriate  Muscle Strength and Tone: normal  Gait and Station: Normal    Time Spent on this Encounter   I, Sarai Bell, personally saw the patient today and spent less than or equal to 30 minutes discharging this patient.    Discharge Disposition   Discharged to home  Condition at discharge: Stable    Consultations This Hospital Stay   None    Discharge Orders   No discharge procedures on file.  Discharge Medications   Current Discharge Medication List      START taking these medications    Details   !! divalproex sodium extended-release (DEPAKOTE ER) 250 MG 24 hr tablet Take 1 tablet (250 mg) by mouth daily  Qty: 30 tablet, Refills: 0    Associated Diagnoses: Severe mixed bipolar 1 disorder without psychosis (H)      !! divalproex sodium extended-release (DEPAKOTE ER) 500 MG 24 hr tablet Take 2 tablets (1,000 mg) by mouth daily  Qty: 60 tablet,  Refills: 0    Associated Diagnoses: Severe mixed bipolar 1 disorder without psychosis (H)      triamterene-hydrochlorothiazide (DYAZIDE) 37.5-25 MG per capsule Take 1 capsule by mouth daily  Qty: 30 capsule, Refills: 0    Associated Diagnoses: Localized edema       !! - Potential duplicate medications found. Please discuss with provider.      CONTINUE these medications which have NOT CHANGED    Details   Atorvastatin Calcium (LIPITOR PO) Take 20 mg by mouth daily      CLONAZEPAM PO Take 0.5 mg by mouth At Bedtime Take one to two tablets at bedtime      HYDROXYZINE HCL PO Take 50 mg by mouth Take 1/2 to 1 tablet at bedtime as needed      TraZODone HCl (DESYREL PO) Take 200 mg by mouth Take two tablets at bedtime         STOP taking these medications       LamoTRIgine (LAMICTAL PO) Comments:   Reason for Stopping:             Allergies   No Known Allergies

## 2019-06-03 ENCOUNTER — TRANSFERRED RECORDS (OUTPATIENT)
Dept: HEALTH INFORMATION MANAGEMENT | Facility: CLINIC | Age: 64
End: 2019-06-03

## 2019-06-03 LAB
CREAT SERPL-MCNC: 1.01 MG/DL (ref 0.6–1.3)
GFR SERPL CREATININE-BSD FRML MDRD: >60 ML/MIN/1.73M2
GLUCOSE SERPL-MCNC: 92 MG/DL (ref 70–100)
POTASSIUM SERPL-SCNC: 3.4 MMOL/L (ref 3.5–5.1)

## 2019-06-04 ENCOUNTER — HOSPITAL ENCOUNTER (INPATIENT)
Facility: HOSPITAL | Age: 64
LOS: 6 days | Discharge: HOME | End: 2019-06-10
Attending: PSYCHIATRY & NEUROLOGY | Admitting: PSYCHIATRY & NEUROLOGY
Payer: COMMERCIAL

## 2019-06-04 ENCOUNTER — TELEPHONE (OUTPATIENT)
Dept: BEHAVIORAL HEALTH | Facility: CLINIC | Age: 64
End: 2019-06-04

## 2019-06-04 DIAGNOSIS — R60.0 LOCALIZED EDEMA: ICD-10-CM

## 2019-06-04 DIAGNOSIS — F31.13 BIPOLAR DISORDER, CURRENT EPISODE MANIC WITHOUT PSYCHOTIC FEATURES, SEVERE (H): ICD-10-CM

## 2019-06-04 DIAGNOSIS — B99.9 INFECTION: Primary | ICD-10-CM

## 2019-06-04 PROCEDURE — 25000132 ZZH RX MED GY IP 250 OP 250 PS 637: Performed by: NURSE PRACTITIONER

## 2019-06-04 PROCEDURE — 12400000 ZZH R&B MH

## 2019-06-04 RX ORDER — ALUMINA, MAGNESIA, AND SIMETHICONE 2400; 2400; 240 MG/30ML; MG/30ML; MG/30ML
30 SUSPENSION ORAL EVERY 4 HOURS PRN
Status: DISCONTINUED | OUTPATIENT
Start: 2019-06-04 | End: 2019-06-10 | Stop reason: HOSPADM

## 2019-06-04 RX ORDER — ACETAMINOPHEN 500 MG
1000 TABLET ORAL EVERY 6 HOURS PRN
Status: ON HOLD | COMMUNITY
End: 2019-06-05

## 2019-06-04 RX ORDER — QUETIAPINE FUMARATE 100 MG/1
100 TABLET, FILM COATED ORAL AT BEDTIME
Status: ON HOLD | COMMUNITY
End: 2019-06-05

## 2019-06-04 RX ORDER — HYDROXYZINE HYDROCHLORIDE 10 MG/1
30-50 TABLET, FILM COATED ORAL EVERY 4 HOURS PRN
Status: DISCONTINUED | OUTPATIENT
Start: 2019-06-04 | End: 2019-06-10 | Stop reason: HOSPADM

## 2019-06-04 RX ORDER — OLANZAPINE 10 MG/1
10 TABLET ORAL 2 TIMES DAILY PRN
Status: DISCONTINUED | OUTPATIENT
Start: 2019-06-04 | End: 2019-06-08

## 2019-06-04 RX ORDER — TRAZODONE HYDROCHLORIDE 50 MG/1
50 TABLET, FILM COATED ORAL
Status: DISCONTINUED | OUTPATIENT
Start: 2019-06-04 | End: 2019-06-05

## 2019-06-04 RX ORDER — LAMOTRIGINE 100 MG/1
200 TABLET ORAL AT BEDTIME
Status: ON HOLD | COMMUNITY
End: 2019-06-05

## 2019-06-04 RX ORDER — TRAZODONE HYDROCHLORIDE 100 MG/1
100 TABLET ORAL AT BEDTIME
Status: ON HOLD | COMMUNITY
End: 2019-06-05

## 2019-06-04 RX ORDER — OLANZAPINE 10 MG/2ML
10 INJECTION, POWDER, FOR SOLUTION INTRAMUSCULAR 2 TIMES DAILY PRN
Status: DISCONTINUED | OUTPATIENT
Start: 2019-06-04 | End: 2019-06-08

## 2019-06-04 RX ORDER — GABAPENTIN 100 MG/1
100-300 CAPSULE ORAL 3 TIMES DAILY
Status: ON HOLD | COMMUNITY
End: 2019-06-05

## 2019-06-04 RX ORDER — AMOXICILLIN 500 MG/1
500 CAPSULE ORAL 3 TIMES DAILY
Status: ON HOLD | COMMUNITY
Start: 2019-06-05 | End: 2019-06-05

## 2019-06-04 RX ORDER — ACETAMINOPHEN 325 MG/1
650 TABLET ORAL EVERY 4 HOURS PRN
Status: DISCONTINUED | OUTPATIENT
Start: 2019-06-04 | End: 2019-06-10 | Stop reason: HOSPADM

## 2019-06-04 RX ADMIN — TRAZODONE HYDROCHLORIDE 50 MG: 50 TABLET ORAL at 20:38

## 2019-06-04 ASSESSMENT — ACTIVITIES OF DAILY LIVING (ADL)
SWALLOWING: 0-->SWALLOWS FOODS/LIQUIDS WITHOUT DIFFICULTY
TOILETING: 0-->INDEPENDENT
RETIRED_COMMUNICATION: 0-->UNDERSTANDS/COMMUNICATES WITHOUT DIFFICULTY
RETIRED_EATING: 0-->INDEPENDENT
BATHING: 0-->INDEPENDENT
AMBULATION: 0-->INDEPENDENT
FALL_HISTORY_WITHIN_LAST_SIX_MONTHS: NO
DRESS: 0-->INDEPENDENT
TRANSFERRING: 0-->INDEPENDENT
COGNITION: 0 - NO COGNITION ISSUES REPORTED

## 2019-06-04 ASSESSMENT — MIFFLIN-ST. JEOR: SCORE: 1785.54

## 2019-06-04 NOTE — PROGRESS NOTES
06/04/19 1836   Patient Belongings   Did you bring any home meds/supplements to the hospital?  Yes   Disposition of meds  Sent to security/pharmacy per site process   Patient Belongings remains with patient;locker;sent to security per site process;other (see comments)   Patient Belongings Remaining with Patient ring   Patient Belongings Put in Hospital Secure Location (Security or Locker, etc.) cash/credit card;bracelet;money (see comment);necklace;wallet;clothing;shoes;other (see comments);keys;medication(s)   Belongings Search Yes   Clothing Search Yes   Second Staff Julio SANTOS   Comment Wanda willow walking stick,  Worn black leather Timbo Hall jacket, Black leather vest, Set of blue scrubs, Pair black socks, 2 Pair red slipper socks, Aqua underwear, Pair tennis shoes, Black zip up hooded jacket, Black long sleeved shirt, Black Hogs Breath Saloon Tshirt, Black leather belt with buckle, Black [place] wildlife baseball cap with feather, Hardcover Gideons Bible with 4 bookmarks, Small spiral notebook, Black leatherlike folder full of papers, 2 plastic bracelets, 4 wooden bead bracelets, Sunglasses, Black bandana, Lighter, 7 ink pens, Granola bar & candy, 1 oz tube of lotion, 3 binder clips, push pin, 2 Band Aids, USB on string with key charm & safety pin, $.12 in coin, Leather wallet with chain, Leather cord necklace with wooden beads, 2 Polished rocks, 2 Rocks.  SENT TO SECURITY; Scissors, Keyring with 3 keys, Keyring with Autostart remote & 6 keys, AAA card, [place] [employer] Mastercard, [place] EBT card, Social Security card,, Medicare card, [place] 's License, [employer] checkbook with 4 filled out and signed checks (#1815 #[id number]#1821), $140 in cash consisting of 1 $100 and 2 $20s.   List items sent to [employer] Scissors,  [place] 's License, AAA card, [place] EBT card, Social Security card, Medicare card, [place] [employer] Mastercard, [place] checkbook with 4 checks filled out and signed  (#1815, #[id number]#1821), Keyring with 3 keys, Keyring with Autostart remote & 6 keys, $140 in cash consisting of 1 $100 and 2 $20s.      Eyeglasses remaining with patient    All other belongings put in assigned cubby in belongings room.       I have reviewed my belongings list on admission and verify that it is correct.     Patient signature_______________________________    Second staff witness (if patient unable to sign) ______________________________       I have received all my belongings at discharge.    Patient signature________________________________    Edith ARGUETA  6/4/2019  6:55 PM

## 2019-06-04 NOTE — PLAN OF CARE
ADMISSION NOTE    Reason for admission Mariela.  Safety concerns None.  Risk for or history of violence No.   Full skin assessment: yes skin intact    Patient arrived on unit from [facility] ER accompanied by Security on 6/4/2019  6:51 PM.   Status on arrival: Calm and cooperative  /73   Pulse 85   Temp 97.1  F (36.2  C) (Tympanic)   Resp 16   Ht 1.829 m (6')   Wt 95.8 kg (211 lb 1.6 oz)   SpO2 98%   BMI 28.63 kg/m    Patient given tour of unit and Welcome to  unit papers given to patient, wanding completed, belongings inventoried, and admission assessment completed.   Patient's legal status on arrival is Vounlentary. Appropriate legal rights discussed with and copy given to patient. Patient Bill of Rights discussed with and copy given to patient.   Patient denies SI, HI, and thoughts of self harm and contracts for safety while on unit.     Julio Barreto  6/4/2019  2291

## 2019-06-04 NOTE — TELEPHONE ENCOUNTER
"S: [facility] [place], [place] on this 63 year old male.    B: Pt presents to ED with c/o worsening manic symptoms over the past week and inability to sleep for 3-4 days. Pt states that his lack of sleep is in part due to his neighbors being too loud, and as a result, pt has had thoughts of wanting to \"get in their faces\". Pt reports that he brought himself to the ED for stabilization of his enzo instead of confronting his neighbors- pt is requesting admission. 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 Pt alert and oriented x4. Presents as somewhat irritable but cooperative. Elevated affect. Speech rapid and pressured. Pt tangential and jumps from topic to topic and difficult to follow at times. Pt reports that he had a psychiatrist in [place] but that this provider \"refused\" to continue services with him. Pt would not elaborate regarding why this occurred and jumped to another topic. Pt states he's on a \"whole bunch\" of meds but can only recall Lamictal and clonidine (pt is not actually on clonidine according to current med list). However, when pt was admitted to [facility] in April 2018, he was cross titrated off Lamictal and started on Depakote. Pt reports he's been compliant with outpatient medications though this is quite questionable, as pt is so dysregulated that he cannot confirm which meds he's prescribed. Denies alcohol or drug use. No acute medical concerns. VS: /92, HR 88, T 99.1, RR 16, O2 99%. Pt is ambulatory. Labs unremarkable. UDS negative.     A: Voluntary.    R: Admit to [facility] unit 5N under Dr. Butt. Kaci accepts. Unit notified of pending admission. [facility] " given disposition and to not set up transportation until after nurse to nurse is complete.

## 2019-06-05 PROCEDURE — 12400000 ZZH R&B MH

## 2019-06-05 PROCEDURE — 25000132 ZZH RX MED GY IP 250 OP 250 PS 637: Performed by: NURSE PRACTITIONER

## 2019-06-05 PROCEDURE — 99223 1ST HOSP IP/OBS HIGH 75: CPT | Performed by: NURSE PRACTITIONER

## 2019-06-05 RX ORDER — CHLORAL HYDRATE 500 MG
2 CAPSULE ORAL DAILY
Status: DISCONTINUED | OUTPATIENT
Start: 2019-06-05 | End: 2019-06-10 | Stop reason: HOSPADM

## 2019-06-05 RX ORDER — AMOXICILLIN 500 MG/1
500 CAPSULE ORAL
Status: COMPLETED | OUTPATIENT
Start: 2019-06-05 | End: 2019-06-08

## 2019-06-05 RX ORDER — LAMOTRIGINE 100 MG/1
200 TABLET ORAL 2 TIMES DAILY
Status: DISCONTINUED | OUTPATIENT
Start: 2019-06-05 | End: 2019-06-05

## 2019-06-05 RX ORDER — LITHIUM CARBONATE 150 MG/1
150 CAPSULE ORAL DAILY
Status: DISCONTINUED | OUTPATIENT
Start: 2019-06-05 | End: 2019-06-06

## 2019-06-05 RX ORDER — LANOLIN ALCOHOL/MO/W.PET/CERES
3 CREAM (GRAM) TOPICAL
Status: DISCONTINUED | OUTPATIENT
Start: 2019-06-05 | End: 2019-06-07

## 2019-06-05 RX ORDER — QUETIAPINE FUMARATE 100 MG/1
100 TABLET, FILM COATED ORAL AT BEDTIME
Status: DISCONTINUED | OUTPATIENT
Start: 2019-06-05 | End: 2019-06-05

## 2019-06-05 RX ORDER — LAMOTRIGINE 150 MG/1
150 TABLET ORAL DAILY
Status: ON HOLD | COMMUNITY
Start: 2019-06-05 | End: 2019-06-10

## 2019-06-05 RX ORDER — QUETIAPINE FUMARATE 100 MG/1
100 TABLET, FILM COATED ORAL 3 TIMES DAILY PRN
Status: DISCONTINUED | OUTPATIENT
Start: 2019-06-05 | End: 2019-06-08

## 2019-06-05 RX ORDER — ACETAMINOPHEN 500 MG
500 TABLET ORAL EVERY 6 HOURS PRN
COMMUNITY

## 2019-06-05 RX ORDER — QUETIAPINE FUMARATE 100 MG/1
100 TABLET, FILM COATED ORAL AT BEDTIME
Status: ON HOLD | COMMUNITY
End: 2019-06-10

## 2019-06-05 RX ORDER — GABAPENTIN 100 MG/1
100 CAPSULE ORAL 3 TIMES DAILY
Status: ON HOLD | COMMUNITY
End: 2019-06-10

## 2019-06-05 RX ORDER — TRAZODONE HYDROCHLORIDE 100 MG/1
100 TABLET ORAL AT BEDTIME
Status: DISCONTINUED | OUTPATIENT
Start: 2019-06-05 | End: 2019-06-05

## 2019-06-05 RX ORDER — GABAPENTIN 100 MG/1
100 CAPSULE ORAL 3 TIMES DAILY
Status: DISCONTINUED | OUTPATIENT
Start: 2019-06-05 | End: 2019-06-05

## 2019-06-05 RX ORDER — NORTRIPTYLINE HCL 25 MG
25 CAPSULE ORAL AT BEDTIME
Status: ON HOLD | COMMUNITY
End: 2019-06-10

## 2019-06-05 RX ORDER — LAMOTRIGINE 200 MG/1
200 TABLET ORAL DAILY
Status: ON HOLD | COMMUNITY
Start: 2019-06-14 | End: 2019-06-10

## 2019-06-05 RX ORDER — CLONAZEPAM 0.5 MG/1
1 TABLET ORAL
Refills: 0 | Status: ON HOLD | COMMUNITY
Start: 2019-01-25 | End: 2019-06-05

## 2019-06-05 RX ORDER — QUETIAPINE FUMARATE 100 MG/1
100 TABLET, FILM COATED ORAL 3 TIMES DAILY PRN
Status: DISCONTINUED | OUTPATIENT
Start: 2019-06-05 | End: 2019-06-05

## 2019-06-05 RX ORDER — TRAZODONE HYDROCHLORIDE 100 MG/1
100 TABLET ORAL AT BEDTIME
COMMUNITY

## 2019-06-05 RX ORDER — LAMOTRIGINE 25 MG/1
25 TABLET ORAL DAILY
Status: DISCONTINUED | OUTPATIENT
Start: 2019-06-06 | End: 2019-06-06

## 2019-06-05 RX ORDER — LAMOTRIGINE 100 MG/1
200 TABLET ORAL DAILY
Status: DISCONTINUED | OUTPATIENT
Start: 2019-06-05 | End: 2019-06-05

## 2019-06-05 RX ORDER — CHLORAL HYDRATE 500 MG
2 CAPSULE ORAL DAILY
COMMUNITY

## 2019-06-05 RX ORDER — GABAPENTIN 300 MG/1
300 CAPSULE ORAL 3 TIMES DAILY
Status: DISCONTINUED | OUTPATIENT
Start: 2019-06-05 | End: 2019-06-06

## 2019-06-05 RX ORDER — IBUPROFEN 600 MG/1
600 TABLET, FILM COATED ORAL EVERY 6 HOURS PRN
Status: DISCONTINUED | OUTPATIENT
Start: 2019-06-05 | End: 2019-06-06

## 2019-06-05 RX ADMIN — AMOXICILLIN 500 MG: 500 CAPSULE ORAL at 17:57

## 2019-06-05 RX ADMIN — GABAPENTIN 300 MG: 300 CAPSULE ORAL at 16:06

## 2019-06-05 RX ADMIN — LITHIUM CARBONATE 150 MG: 150 CAPSULE, GELATIN COATED ORAL at 11:08

## 2019-06-05 RX ADMIN — TRAZODONE HYDROCHLORIDE 50 MG: 50 TABLET ORAL at 01:52

## 2019-06-05 RX ADMIN — ACETAMINOPHEN 650 MG: 325 TABLET, FILM COATED ORAL at 16:06

## 2019-06-05 RX ADMIN — ACETAMINOPHEN 650 MG: 325 TABLET, FILM COATED ORAL at 08:13

## 2019-06-05 RX ADMIN — GABAPENTIN 300 MG: 300 CAPSULE ORAL at 21:05

## 2019-06-05 RX ADMIN — GABAPENTIN 300 MG: 300 CAPSULE ORAL at 11:08

## 2019-06-05 RX ADMIN — OMEGA-3 FATTY ACIDS CAP DELAYED RELEASE 1000 MG 2 G: 1000 CAPSULE DELAYED RELEASE at 11:08

## 2019-06-05 RX ADMIN — MELATONIN TAB 3 MG 3 MG: 3 TAB at 21:05

## 2019-06-05 RX ADMIN — IBUPROFEN 600 MG: 600 TABLET ORAL at 16:06

## 2019-06-05 RX ADMIN — OLANZAPINE 10 MG: 10 TABLET, FILM COATED ORAL at 01:51

## 2019-06-05 ASSESSMENT — ACTIVITIES OF DAILY LIVING (ADL)
ORAL_HYGIENE: INDEPENDENT
DRESS: SCRUBS (BEHAVIORAL HEALTH);INDEPENDENT
LAUNDRY: UNABLE TO COMPLETE
FALL_HISTORY_WITHIN_LAST_SIX_MONTHS: YES
NUMBER_OF_TIMES_PATIENT_HAS_FALLEN_WITHIN_LAST_SIX_MONTHS: 2
WHICH_OF_THE_ABOVE_FUNCTIONAL_RISKS_HAD_A_RECENT_ONSET_OR_CHANGE?: FALL HISTORY
HYGIENE/GROOMING: INDEPENDENT
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
## 2019-06-05 NOTE — PROGRESS NOTES
06/04/19 1836   Patient Belongings   Did you bring any home meds/supplements to the hospital?  Yes   Disposition of meds  Sent to security/pharmacy per site process   Patient Belongings remains with patient;locker;sent to security per site process;other (see comments)   Patient Belongings Remaining with Patient ring;glasses   Patient Belongings Put in Hospital Secure Location (Security or Locker, etc.) cash/credit card;bracelet;money (see comment);necklace;wallet;clothing;shoes;other (see comments);keys;medication(s)   Belongings Search Yes   Clothing Search Yes   Second Staff Julio SANTOS   Comment Wanda willow walking stick,  Worn black leather Timbo Hall jacket, Black leather vest, Set of blue scrubs, Pair black socks, 2 Pair red slipper socks, Aqua underwear, Pair tennis shoes, Black zip up hooded jacket, Black long sleeved shirt, Black Hogs Breath Saloon Tshirt, Black leather belt with buckle, Black [place] wildlife baseball cap with feather, Hardcover Gideons Bible with 4 bookmarks, Small spiral notebook, Black leatherlike folder full of papers, 2 plastic bracelets, 4 wooden bead bracelets, Sunglasses, Black bandana, Lighter, 7 ink pens, Granola bar & candy, 1 oz tube of lotion, 3 binder clips, push pin, 2 Band Aids, USB on string with key charm & safety pin, $.12 in coin, Leather wallet with chain, Leather cord necklace with wooden beads, 2 Polished rocks, 2 Rocks.  REMAINING WITH PATIENT; 2 rings, eyeglasses, SENT TO SECURITY; Scissors, Small knife with leather scabbard,Keyring with 3 keys, Keyring with Autostart remote & 6 keys, AAA card, [place] [employer] Mastercard, [place] EBT card, Social Security card,, [place] card, [place] '[place], [employer] checkbook with 4 filled out and signed checks (#1815 #[id number]), $140 in cash consisting of 1 $100 and 2 $20s.   List items sent to safe: [place] '[place] License, AAA card, [place] EBT card, Social Security card, Medicare card, [place]'s Jelli  [place]'s [employer] checkbook with 4 filled out & signed checks#1815, #1819-#1821, Keyring with 3 keys, Keyring with Autostart remote & 6 keys, $140 in cash consisting of 1$100 and 2 $20s.    Also sent to security scissors, small knife in leather scabbard.    All other belongings put in assigned cubby in belongings room.       I have reviewed my belongings list on admission and verify that it is correct.     Patient signature_______________________________    Second staff witness (if patient unable to sign) ______________________________       I have received all my belongings at discharge.    Patient signature________________________________    Edith ARGUETA  6/4/2019  7:14 PM
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
## 2019-06-05 NOTE — PLAN OF CARE
Problem: Adult Behavioral Health Plan of Care  Goal: Patient-Specific Goal (Individualization)  Description  Pt will attend 50% of unit programming  Pt will maintain appropriate hygiene  Pt will complete ADL's independently   6/5/2019 0008 by Sharda Pyle, RN  Outcome: No Change     Problem: Sleep Disturbance  Goal: Adequate Sleep/Rest  Description  Pt will sleep 6-8 hours per night and report feeling rested by discharge   6/5/2019 0008 by Sharda Pyle, RN  Outcome: No Change     Problem: Thought Process Alteration  Goal: Optimal Thought Clarity  Description  Pt will be able to have rational conversations with no delusional content by discharge  Pt will exhibit appropriate boundaries with peers and staff  Pt will take medications as perscribed   6/5/2019 0008 by Sharda Pyle, RN  Outcome: No Change    Face to face shift report recieved from Julio DIAZ. Rounding completed, pt observed.     Pt appeared to be sleeping most of this shift, normal respirations and position changes noted. Pt did wake up and was given Trazodone 50 mg per request for continued sleep and Zyprexa 10 mg for enzo with pressured tangential speech at 0152. Pt also requesting an antibiotic for complaints of an infected tooth. Pt appeared to return to sleep. Pt had appropriate boundaries this shift.     Face to face report will be communicated to ariella DIAZ.    Sharda Pyle  6/5/2019  6:11 AM

## 2019-06-05 NOTE — PLAN OF CARE
Problem: Thought Process Alteration  Goal: Optimal Thought Clarity  Description  Pt will be able to have rational conversations with no delusional content by discharge  Pt will exhibit appropriate boundaries with peers and staff  Pt will take medications as prescribed   Outcome: Improving  Pt is calm and been appropriate in his interactions with peers and staff     Problem: Sleep Disturbance  Goal: Adequate Sleep/Rest  Description  Pt will sleep 6-8 hours per night and report feeling rested by discharge   Outcome: Improving  Pt was tired all evening and asked for Trazodone and went to bed at about 2100     Problem: Adult Behavioral Health Plan of Care  Goal: Patient-Specific Goal (Individualization)  Description  Pt will attend 50% of unit programming  Pt will maintain appropriate hygiene  Pt will complete ADL's independently   Outcome: Improving  Pt has attended all of the available groups and was social with peers some.  Pt shared with me  that he has not slept for about 4 days and felt like he had slipped into enzo. Pt's thinking is significantly tangential and he has flight of ideas. 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 He described this and then started talking about having conflicts with other residents in he building he lives at. He feels like that two residents there are threatening and bullies. He told me that he is educated enough to know not to respond to them, but did turn them into the [facility] office.     Face to face end of shift report communicated to Herbie.     Julio Barreto  6/4/2019  10:10 PM

## 2019-06-05 NOTE — PLAN OF CARE
Face to Face report received from JOE Robin.  Rounding completed. Patient observed in in [facility].   0820: patient signed 12 hour notice of intent to discharge.  0840: provider Sarai Bell NP notified of 12 notice of intent to discharge.  0930: provider met with patient. Patient agreeable to stay at this time.   Problem: Adult Behavioral Health Plan of Care  Goal: Patient-Specific Goal (Individualization)  Description  Pt will attend 50% of unit programming  Pt will maintain appropriate hygiene  Pt will complete ADL's independently   6/5/2019 0806 by Rafael Rhodes, RN  Outcome: No Change  He is up on the unit this morning. He is grandiose and condescending to staff. He denies feeling depressed or suicidal. He denies anxiety. He is maintaining appropriate boundaries with staff and peers. He says that he is medication compliant but other information from his  indicates that this is not the case.        Problem: Thought Process Alteration  Goal: Optimal Thought Clarity  Description  Pt will be able to have rational conversations with no delusional content by discharge  Pt will exhibit appropriate boundaries with peers and staff  Pt will take medications as perscribed   6/5/2019 0806 by Rafael Rhodes, RN  Outcome: No Change  He doesn't make any delusional statements at this time.     Problem: Fall Injury Risk  Goal: Absence of Fall and Fall-Related Injury  Outcome: No Change  He generally uses a cane. He declines to use a walker. He is encouraged to use hand rails, rise slowly and get his bearings before standing. He will be moved to the open unit 554 to be near the nurses station and given a call bell.     Face to face end of shift report to be communicated to evening shift RN.     Rafael Rhodes  6/5/2019  2:44 PM

## 2019-06-05 NOTE — PLAN OF CARE
BEHAVIORAL TEAM DISCUSSION  f  Participants: Sarai Bell NP, Lis Morse [facility] CareSW, Cintia Martinez LSW,  Alba Rocha LSW, Joelle Frausto SW, Rafael Rhodes RN, Rochelle Colin RN, Tiff Guo [facility], Santa Peacock OT, Sarai Foy OT    Progress: Has been cooperative with intake assessments, attending groups and social with peers  Continued Stay Criteria/Rationale: Very tangential, reports not sleeping for 4 days, flight of ideas, grandiose, feels people are threatening him in his apartment building, provider to check meds and possibly increase, however the  reports he has not been med compliant in the community.   Medical/Physical: Pain all over - sees an out patient pain manager, some tooth pain - provider to assess if he needs antibiotic   Precautions:   Falls precaution?: YES, care plan initiated, refuses to use walker - will move to room across from nurses station and give call bell  Behavioral Orders   Procedures     Code 1 - Restrict to Unit     Routine Programming     As clinically indicated     Self Injury Precaution     Status 15     Every 15 minutes.     Plan: Needs to be assessed by SW, will coordinate with CM  Rationale for change in precautions or plan: None    Active Problems:    Bipolar disorder, current episode manic without psychotic features, severe (H)       Current Facility-Administered Medications:      acetaminophen (TYLENOL) tablet 650 mg, 650 mg, Oral, Q4H PRN, Kaci Santana Mc, APRN CNP, 650 mg at 06/05/19 0813     alum & mag hydroxide-simethicone (MYLANTA ES/MAALOX  ES) suspension 30 mL, 30 mL, Oral, Q4H PRN, Kcai Santana Mc, APRN CNP     fish oil-omega-3 fatty acids capsule 2 g, 2 g, Oral, Daily, Sarai Bell APRN CNP     gabapentin (NEURONTIN) capsule 300 mg, 300 mg, Oral, TID, Sarai Bell APRN CNP     hydrOXYzine (ATARAX) tablet 30-50 mg, 30-50 mg, Oral, Q4H PRN, Kaci Santana Mc, APRN CNP     [START ON 6/6/2019] lamoTRIgine (LaMICtal)  tablet 25 mg, 25 mg, Oral, Daily, Sarai Bell APRN CNP     lithium (ESKALITH) capsule 150 mg, 150 mg, Oral, Daily, Sarai Bell APRN CNP     magnesium hydroxide (MILK OF MAGNESIA) suspension 30 mL, 30 mL, Oral, At Bedtime PRN, Kaci Santana Mc, APRN CNP     nicotine (NICORETTE) gum 2-4 mg, 2-4 mg, Buccal, Q1H PRN, Kaci Santana Mc, APRN CNP     OLANZapine (zyPREXA) tablet 10 mg, 10 mg, Oral, BID PRN, 10 mg at 06/05/19 0151 **OR** OLANZapine (zyPREXA) injection 10 mg, 10 mg, Intramuscular, BID PRN, Kaci Santana Mc, APRN CNP     QUEtiapine (SEROquel) tablet 100 mg, 100 mg, Oral, TID PRN, Sarai Bell APRN CNP     traZODone (DESYREL) tablet 100 mg, 100 mg, Oral, At Bedtime, Sarai Bell APRN CNP     traZODone (DESYREL) tablet 50 mg, 50 mg, Oral, At Bedtime PRN, Kaci Santana Mc, APRN CNP, 50 mg at 06/05/19 0152    .

## 2019-06-06 PROCEDURE — 12400000 ZZH R&B MH

## 2019-06-06 PROCEDURE — 99232 SBSQ HOSP IP/OBS MODERATE 35: CPT | Performed by: NURSE PRACTITIONER

## 2019-06-06 PROCEDURE — 25000132 ZZH RX MED GY IP 250 OP 250 PS 637: Performed by: NURSE PRACTITIONER

## 2019-06-06 RX ORDER — IBUPROFEN 600 MG/1
600 TABLET, FILM COATED ORAL 2 TIMES DAILY
Status: DISCONTINUED | OUTPATIENT
Start: 2019-06-06 | End: 2019-06-06

## 2019-06-06 RX ORDER — GABAPENTIN 400 MG/1
400 CAPSULE ORAL 3 TIMES DAILY
Status: DISCONTINUED | OUTPATIENT
Start: 2019-06-06 | End: 2019-06-10 | Stop reason: HOSPADM

## 2019-06-06 RX ORDER — IBUPROFEN 600 MG/1
600 TABLET, FILM COATED ORAL 2 TIMES DAILY
Status: DISCONTINUED | OUTPATIENT
Start: 2019-06-06 | End: 2019-06-07

## 2019-06-06 RX ORDER — FUROSEMIDE 20 MG
20 TABLET ORAL DAILY
Status: DISCONTINUED | OUTPATIENT
Start: 2019-06-06 | End: 2019-06-10 | Stop reason: HOSPADM

## 2019-06-06 RX ORDER — LITHIUM CARBONATE 300 MG/1
300 TABLET, FILM COATED, EXTENDED RELEASE ORAL AT BEDTIME
Status: DISCONTINUED | OUTPATIENT
Start: 2019-06-06 | End: 2019-06-07

## 2019-06-06 RX ADMIN — LITHIUM CARBONATE 300 MG: 300 TABLET, EXTENDED RELEASE ORAL at 20:01

## 2019-06-06 RX ADMIN — LAMOTRIGINE 25 MG: 25 TABLET ORAL at 08:23

## 2019-06-06 RX ADMIN — GABAPENTIN 400 MG: 400 CAPSULE ORAL at 20:23

## 2019-06-06 RX ADMIN — GABAPENTIN 400 MG: 400 CAPSULE ORAL at 14:13

## 2019-06-06 RX ADMIN — OMEGA-3 FATTY ACIDS CAP DELAYED RELEASE 1000 MG 2 G: 1000 CAPSULE DELAYED RELEASE at 08:23

## 2019-06-06 RX ADMIN — ACETAMINOPHEN 650 MG: 325 TABLET, FILM COATED ORAL at 08:27

## 2019-06-06 RX ADMIN — ACETAMINOPHEN 650 MG: 325 TABLET, FILM COATED ORAL at 00:44

## 2019-06-06 RX ADMIN — AMOXICILLIN 500 MG: 500 CAPSULE ORAL at 12:43

## 2019-06-06 RX ADMIN — MELATONIN TAB 3 MG 3 MG: 3 TAB at 20:01

## 2019-06-06 RX ADMIN — ACETAMINOPHEN 650 MG: 325 TABLET, FILM COATED ORAL at 16:24

## 2019-06-06 RX ADMIN — AMOXICILLIN 500 MG: 500 CAPSULE ORAL at 08:23

## 2019-06-06 RX ADMIN — OLANZAPINE 10 MG: 10 TABLET, FILM COATED ORAL at 02:06

## 2019-06-06 RX ADMIN — ACETAMINOPHEN 650 MG: 325 TABLET, FILM COATED ORAL at 20:23

## 2019-06-06 RX ADMIN — GABAPENTIN 300 MG: 300 CAPSULE ORAL at 08:23

## 2019-06-06 RX ADMIN — FUROSEMIDE 20 MG: 20 TABLET ORAL at 12:43

## 2019-06-06 RX ADMIN — AMOXICILLIN 500 MG: 500 CAPSULE ORAL at 20:01

## 2019-06-06 RX ADMIN — LITHIUM CARBONATE 150 MG: 150 CAPSULE, GELATIN COATED ORAL at 08:23

## 2019-06-06 RX ADMIN — IBUPROFEN 600 MG: 600 TABLET ORAL at 16:24

## 2019-06-06 ASSESSMENT — ACTIVITIES OF DAILY LIVING (ADL)
HYGIENE/GROOMING: INDEPENDENT
LAUNDRY: UNABLE TO COMPLETE
DRESS: SCRUBS (BEHAVIORAL HEALTH);INDEPENDENT
ORAL_HYGIENE: INDEPENDENT

## 2019-06-06 NOTE — PHARMACY
[facility]    Pharmacy      Antimicrobial Stewardship Note     Current antimicrobial therapy:  Anti-infectives (From now, onward)    Start     Dose/Rate Route Frequency Ordered Stop    06/05/19 1800  amoxicillin (AMOXIL) capsule 500 mg      500 mg Oral 3 TIMES DAILY. 06/05/19 1541 06/09/19 0759        Indication: Abscess/Tooth infection    Days of Therapy: 2     Pertinent labs:  Creatinine   Creatinine   Date Value Ref Range Status   04/17/2018 1.08 0.70 - 1.20 mg/dL Final   10/19/2015 1.2 0.7 - 1.3 mg/dL Final     WBC No results found for: WBC  Procalcitonin No results found for: PCAL  CRP No results found for: CRP    Culture Results: None     Recommendations/Interventions:  1. No recommendations at this time.     Lee Lane  June 6, 2019

## 2019-06-06 NOTE — PLAN OF CARE
Problem: Fall Injury Risk  Goal: Absence of Fall and Fall-Related Injury  6/5/2019 2138 by Julio Barreto, RN  Outcome: Improving  Pt has been steady on his feet thought does not want to use a walker     Problem: Thought Process Alteration  Goal: Optimal Thought Clarity  Description  Pt will be able to have rational conversations with no delusional content by discharge  Pt will exhibit appropriate boundaries with peers and staff  Pt will take medications as prescribed   6/5/2019 2138 by Julio Barreto, RN  Outcome: No Change   Pt continues to have some tangential thinking with grandiosity    Problem: Adult Behavioral Health Plan of Care  Goal: Patient-Specific Goal (Individualization)  Description  Pt will attend 50% of unit programming  Pt will maintain appropriate hygiene  Pt will complete ADL's independently   6/5/2019 2138 by Julio Barreto, RN  Outcome: Improving   Face to face end of shift report communicated to Sharda and Cory Barreto   6/5/2019  2300 PM        Pt has been up and active all shift. He attended all of the available groups.   He is very talkative with peers and staff about various topics. He was given teaching on the Lithium along with a handout.  He told me that he knows that he is scattered and disorganized at times. He is complaining of pain from his abscessed tooth and does have some facial swelling on the right side of his face.  Pt was given Ibuprofen and Tylenol for pain with some relief.  He was started on the Amoxicillin that was ordered at the ER at [facility] in [place]. He also showed me his left lower leg is significantly swollen.  It is not warm to the touch but is about +3 edema. He told me that it has been this way for a few weeks.  A note to the provider was added to have it assessed to marrow.  Pt also asked for a radha to see him before the weekend.   PT has appropriate questions about the Lithium and appeared to understand the teaching.

## 2019-06-06 NOTE — PLAN OF CARE
BEHAVIORAL TEAM DISCUSSION    Participants: Sarai Bell NP,  Lis Morse LICSW, Cintia Martinez LSW,  Alba Rocha LSW, Joelle Frausto LGSW,  Rafael Rhodes RN, Carlitos Sofia RN, Santa Peacock OT, Sarai Foy OT  Progress: none   Continued Stay Criteria/Rationale: making medication changes, manic, labile, grandiose   Medical/Physical: tooth pain - PRN ibuprofen ordered, and antibiotic ordered, edema L ankle,   Precautions:   Falls precaution?: YES - care plan initiated   Behavioral Orders   Procedures    Code 1 - Restrict to Unit    Routine Programming     As clinically indicated    Self Injury Precaution    Status 15     Every 15 minutes.     Plan: medication adjustments and discharge planning   Rationale for change in precautions or plan: none     Active Problems:    Bipolar disorder, current episode manic without psychotic features, severe (H)       Current Facility-Administered Medications:     acetaminophen (TYLENOL) tablet 650 mg, 650 mg, Oral, Q4H PRN, Kaci Santana Mc, WESLEY CNP, 650 mg at 06/06/19 0827    alum & mag hydroxide-simethicone (MYLANTA ES/MAALOX  ES) suspension 30 mL, 30 mL, Oral, Q4H PRN, Kaci Santana Mc, APRALINE CNP    amoxicillin (AMOXIL) capsule 500 mg, 500 mg, Oral, TID, Sarai Bell APRN CNP, 500 mg at 06/06/19 0823    fish oil-omega-3 fatty acids capsule 2 g, 2 g, Oral, Daily, WoSarai smith APRN CNP, 2 g at 06/06/19 0823    gabapentin (NEURONTIN) capsule 300 mg, 300 mg, Oral, TID, Sarai Bell APRN CNP, 300 mg at 06/06/19 0823    hydrOXYzine (ATARAX) tablet 30-50 mg, 30-50 mg, Oral, Q4H PRN, Kaci Santana Mc, APRN CNP    ibuprofen (ADVIL/MOTRIN) tablet 600 mg, 600 mg, Oral, Q6H PRN, Sarai Bell APRN CNP, 600 mg at 06/05/19 1606    lamoTRIgine (LaMICtal) tablet 25 mg, 25 mg, Oral, Daily, WoSarai smith APRN CNP, 25 mg at 06/06/19 0823    lithium (ESKALITH) capsule 150 mg, 150 mg, Oral, Daily, Sarai Bell APRN CNP, 150 mg at  06/06/19 0823    magnesium hydroxide (MILK OF MAGNESIA) suspension 30 mL, 30 mL, Oral, At Bedtime PRN, Kaci Santana Mc, APRN CNP    melatonin tablet 3 mg, 3 mg, Oral, At Bedtime PRN, Sarai Bell APRN CNP, 3 mg at 06/05/19 2105    nicotine (NICORETTE) gum 2-4 mg, 2-4 mg, Buccal, Q1H PRN, Kaci Santana Mc, APRN CNP    OLANZapine (zyPREXA) tablet 10 mg, 10 mg, Oral, BID PRN, 10 mg at 06/06/19 0206 **OR** OLANZapine (zyPREXA) injection 10 mg, 10 mg, Intramuscular, BID PRN, Kaci Santana Mc, APRN CNP    QUEtiapine (SEROquel) tablet 100 mg, 100 mg, Oral, TID PRN, Sarai Bell APRN CNP

## 2019-06-06 NOTE — PLAN OF CARE
Problem: Adult Behavioral Health Plan of Care  Goal: Patient-Specific Goal (Individualization)  Description  Pt will attend 50% of unit programming  Pt will maintain appropriate hygiene  Pt will complete ADL's independently   6/5/2019 2328 by Sharda Pyle, RN  Outcome: No Change     Problem: Thought Process Alteration  Goal: Optimal Thought Clarity  Description  Pt will be able to have rational conversations with no delusional content by discharge  Pt will exhibit appropriate boundaries with peers and staff  Pt will take medications as perscribed   6/5/2019 2328 by Sharda Pyle, RN  Outcome: No Change     Problem: Fall Injury Risk  Goal: Absence of Fall and Fall-Related Injury  6/5/2019 2328 by Sharda Pyle, RN  Outcome: Improving    Face to face shift report recieved from Julio DIAZ. Rounding completed, pt observed.    Pt appeared to be sleeping at the beginning of this shift, normal respirations and position changes noted. Pt awake and to nurses station and given Tylenol 650 mg at 0045 for complaints of 8/10 tooth pain. Pt returned to room, remaining awake lying in bed. Pt given Zyprexa 10 mg at 0206 for racing thoughts and inability to fall back asleep. Pt appeared to return to sleep after 0300 checks. Pt awake and out in lobby after 0530 checks. Pt has remained appropriate with staff and peers. Pt has not had any falls this shift.     Face to face report will be communicated to ariella DIAZ.    Sharda Pyle  6/6/2019  6:12 AM

## 2019-06-06 NOTE — PLAN OF CARE
"Face to Face report received from JOE Robin.  Rounding completed. Patient observed in in [facility].    Problem: Adult Behavioral Health Plan of Care  Goal: Patient-Specific Goal (Individualization)  Description  Pt will attend 50% of unit programming  Pt will maintain appropriate hygiene  Pt will complete ADL's independently   6/6/2019 0731 by Rafael Rhodes R N  Outcome: No Change  He is up on the unit. He is social with others. He is maintaining appropriate boundaries with staff and peers. He continues to be grandiose at times. He talks of always being in pain. He is given tylenol 650 mg at 0827. He is tangential in conversation and often answers questions with questions. He is taking medications as prescribed.   1430: he is up on the unit. He is \"counseling\" a male peer. He is given encouragement to maintaining appropriate boundaries. He continues to be grandiose stating \"I have multiple degrees\", \"I am a well educated man\", \"I have thousands of friends on facebook\". He asks to and is allowed to pay a couple of outstanding bills that he has.      Problem: Thought Process Alteration  Goal: Optimal Thought Clarity  Description  Pt will be able to have rational conversations with no delusional content by discharge  Pt will exhibit appropriate boundaries with peers and staff  Pt will take medications as perscribed   6/6/2019 0731 by Rafael Rhodes, RN  Outcome: No Change  He is grandiose. He continues to talk about knowing people around the world, he talks of his heritage and how that he is far superior to most others. He can be condescending.      Problem: Fall Injury Risk  Goal: Absence of Fall and Fall-Related Injury  6/6/2019 0731 by Rafael Rhodes, RN  Outcome: No Change  He continues to be a high fall risk. He has had a couple of falls in the past few months and he reports it was due to medication changes. He is currently undergoing some additional medication changes. He has edema in his left foot/leg. He typically uses " a walking stick for ambulation at home. He is offered a walker during this hospitalization but refuses. He is encouraged to use hand rails in hallways, he has a call bell in his room. He is encouraged to rise slowly from seated or lying position.     Face to face end of shift report to be communicated to evening shift RN.     Rafael Rhodes  6/6/2019  2:40 PM

## 2019-06-06 NOTE — PROGRESS NOTES
"CLINICAL NUTRITION SERVICES  -  ASSESSMENT NOTE    Дмитрий Fleming : Admission Nutrition Risk Screen - unintentional weight loss    63 yom admitted for bipolar disorder. Noted weight loss from early 2018. Does not meet criteria for malnutrition. Pt's appetite is good and is eating well.    Diet Order: Regular  Intake: 100%    Height: 6' 0\"  Weight: 211 lbs 1.6 oz  Body mass index is 28.63 kg/m .  Weight Status:  Overweight BMI 25-29.9  IBWR: 136-183lb  Weight History: Weight loss does not meet criteria  275lb - 4/22/18  225lb - 1/11/19  222lb - 2/27/19  226lb - 3/14/19  218lb - 5/1/19    Estimated Energy Needs: 2400 kcals (25 Kcal/Kg) - current bodyweight  Estimated Protein Needs: 75-95 grams protein (0.8-1 g pro/Kg)- current bodyweight    Malnutrition Diagnosis: Patient does not meet two of the criteria necessary for diagnosing malnutrition.     NUTRITION RECOMMENDATIONS  - Encourage intake    MONITORING AND EVALUATION:  RD will monitor intake, weight, labs      "

## 2019-06-06 NOTE — PROGRESS NOTES
"[facility]    Spiritual Health Progress Note    Date of Service (when I saw the patient): 06/06/2019     Assessment & Plan   Дмитрий Fleming is a 63 year old male who was admitted on 6/4/2019.  Introduced the patient as an initial visit to Spiritual Health Services.  Patient said that he was homeless by choice. When asked why he said because he couldn't get along with his landlord.  Said he had stuff in storage in [place], [place].  Дмитрий said he came into darnell through \"the [employer]\" in [place].  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  Likes to discuss history.  Says that he got involved in a [employer] in [place] and seems to enjoy it.  Talks about many aspirations things that he has done but hard to tell what is based in full reality.  Likes to read and talks about his intellectual abilities.  He did admit he needed some encouragement and hope. We talked about that for a bit.  He asked for a devotional book and followed up with that.  We closed our time with prayer.      Rev. Edward Pyle  Volunteer   "

## 2019-06-06 NOTE — PROGRESS NOTES
"[facility]  Psychiatric Progress Note      Impression:   (per Admit/ED) Pt presents to ED with c/o worsening manic symptoms over the past week and inability to sleep for 3-4 days. Pt states that his lack of sleep is in part due to his neighbors being too loud, and as a result, pt has had thoughts of wanting to \"get in their faces\". Pt reports that he brought himself to the ED for stabilization of his enzo instead of confronting his neighbors- pt is requesting admission.      Patient up in commons area socializing with peer.  He is pleasant and cooperative, remains labile, tangential and grandiose - a bit less condescending.  He is agreeable to continue with lithium, which will be titrated up more quickly than lamictal, which we agree to discontinue at this point.  Patient also agrees to increase gabapentin to 400 mg tid.  He has some edema in his ankles, mainly on the left side and we will initiate lasix for a couple days, encourage to elevate.  Patient did sleep better last night, encourage to try melatonin, and complains of waking in pain, has been taking tylenol and ibu, talks about being referred to [facility] to [facility] pain clinic - unsure if this has happened or not, will need to check for confirmation.  He denies suicidal or homicidal thoughts.    Educated regarding medication indications, risks, benefits, side effects, contraindications and possible interactions. Verbally expressed understanding.        DIagnoses:   Bipolar I Disorder, currently manic             Plan:     Start Lithium  mg at HS  Lasix for edema  Melatonin prn  Antibiotic for tooth abscess      ELOS:  3-5 days for mood stabilization     Attestation:  Patient has been seen and evaluated by me,  WESLEY Alfred CNP          Interim History:   The patient's care was discussed with the treatment team and chart notes were reviewed.          Medications:     Current Facility-Administered Medications Ordered in Epic "   Medication Dose Route Frequency Last Rate Last Dose     acetaminophen (TYLENOL) tablet 650 mg  650 mg Oral Q4H PRN   650 mg at 06/06/19 0827     alum & mag hydroxide-simethicone (MYLANTA ES/MAALOX  ES) suspension 30 mL  30 mL Oral Q4H PRN         amoxicillin (AMOXIL) capsule 500 mg  500 mg Oral TID   500 mg at 06/06/19 1243     fish oil-omega-3 fatty acids capsule 2 g  2 g Oral Daily   2 g at 06/06/19 0823     furosemide (LASIX) tablet 20 mg  20 mg Oral Daily   20 mg at 06/06/19 1243     gabapentin (NEURONTIN) capsule 400 mg  400 mg Oral TID   400 mg at 06/06/19 1413     hydrOXYzine (ATARAX) tablet 30-50 mg  30-50 mg Oral Q4H PRN         ibuprofen (ADVIL/MOTRIN) tablet 600 mg  600 mg Oral BID         lithium ER (LITHOBID) CR tablet 300 mg  300 mg Oral At Bedtime         magnesium hydroxide (MILK OF MAGNESIA) suspension 30 mL  30 mL Oral At Bedtime PRN         melatonin tablet 3 mg  3 mg Oral At Bedtime PRN   3 mg at 06/05/19 2105     nicotine (NICORETTE) gum 2-4 mg  2-4 mg Buccal Q1H PRN         OLANZapine (zyPREXA) tablet 10 mg  10 mg Oral BID PRN   10 mg at 06/06/19 0206    Or     OLANZapine (zyPREXA) injection 10 mg  10 mg Intramuscular BID PRN         QUEtiapine (SEROquel) tablet 100 mg  100 mg Oral TID PRN         No current Epic-ordered outpatient medications on file.          10 point ROS - edema       Allergies:   No Known Allergies         Psychiatric Examination:   /86   Pulse 73   Temp 97.5  F (36.4  C) (Tympanic)   Resp 12   Ht 1.829 m (6')   Wt 95.8 kg (211 lb 1.6 oz)   SpO2 98%   BMI 28.63 kg/m    Weight is 211 lbs 1.6 oz  Body mass index is 28.63 kg/m .    Appearance:  awake, alert  Attitude:  cooperative  Eye Contact:  good  Mood:  elevated, grandiose  Affect:  intensity is heightened  Speech:  pressured speech  Psychomotor Behavior:  no evidence of tardive dyskinesia, dystonia, or tics  Thought Process:  tangental  Associations:  loosening of associations present  Thought Content:  no  evidence of suicidal ideation or homicidal ideation and no evidence of psychotic thought  Insight:  fair  Judgment:  fair  Oriented to:  time, person, and place  Attention Span and Concentration:  intact  Recent and Remote Memory:  intact  Fund of Knowledge: appropriate  Muscle Strength and Tone: normal  Gait and Station: Normal           Labs:     Unremarkable

## 2019-06-06 NOTE — PLAN OF CARE
This writer sat with pt as pt wrote two checks out to be paid - pt reported he was doing better today although does not appear to be much different than from yesterday - continues to have flight of ideas and jumps from topic to topic - it is difficult to follow at times as well. Pt is social with peers and attends unit programming.

## 2019-06-07 PROCEDURE — 25000132 ZZH RX MED GY IP 250 OP 250 PS 637: Performed by: NURSE PRACTITIONER

## 2019-06-07 PROCEDURE — 12400000 ZZH R&B MH

## 2019-06-07 PROCEDURE — 99232 SBSQ HOSP IP/OBS MODERATE 35: CPT | Performed by: NURSE PRACTITIONER

## 2019-06-07 RX ORDER — LITHIUM CARBONATE 450 MG
450 TABLET, EXTENDED RELEASE ORAL AT BEDTIME
Status: DISCONTINUED | OUTPATIENT
Start: 2019-06-07 | End: 2019-06-07

## 2019-06-07 RX ORDER — SALIVA STIMULANT COMB. NO.3
2 SPRAY, NON-AEROSOL (ML) MUCOUS MEMBRANE 4 TIMES DAILY PRN
Status: DISCONTINUED | OUTPATIENT
Start: 2019-06-07 | End: 2019-06-10 | Stop reason: HOSPADM

## 2019-06-07 RX ORDER — QUETIAPINE 150 MG/1
150 TABLET, FILM COATED, EXTENDED RELEASE ORAL AT BEDTIME
Status: DISCONTINUED | OUTPATIENT
Start: 2019-06-07 | End: 2019-06-08

## 2019-06-07 RX ORDER — TRAMADOL HYDROCHLORIDE 50 MG/1
50 TABLET ORAL EVERY 6 HOURS PRN
Status: DISCONTINUED | OUTPATIENT
Start: 2019-06-07 | End: 2019-06-10 | Stop reason: HOSPADM

## 2019-06-07 RX ORDER — NALOXONE HYDROCHLORIDE 0.4 MG/ML
.1-.4 INJECTION, SOLUTION INTRAMUSCULAR; INTRAVENOUS; SUBCUTANEOUS
Status: DISCONTINUED | OUTPATIENT
Start: 2019-06-07 | End: 2019-06-10 | Stop reason: HOSPADM

## 2019-06-07 RX ORDER — LITHIUM CARBONATE 300 MG/1
600 TABLET, FILM COATED, EXTENDED RELEASE ORAL AT BEDTIME
Status: DISCONTINUED | OUTPATIENT
Start: 2019-06-07 | End: 2019-06-10 | Stop reason: HOSPADM

## 2019-06-07 RX ORDER — LANOLIN ALCOHOL/MO/W.PET/CERES
6 CREAM (GRAM) TOPICAL
Status: DISCONTINUED | OUTPATIENT
Start: 2019-06-07 | End: 2019-06-10 | Stop reason: HOSPADM

## 2019-06-07 RX ADMIN — TRAMADOL HYDROCHLORIDE 50 MG: 50 TABLET, COATED ORAL at 12:59

## 2019-06-07 RX ADMIN — QUETIAPINE FUMARATE 150 MG: 150 TABLET, EXTENDED RELEASE ORAL at 20:46

## 2019-06-07 RX ADMIN — ACETAMINOPHEN 650 MG: 325 TABLET, FILM COATED ORAL at 17:09

## 2019-06-07 RX ADMIN — QUETIAPINE 100 MG: 100 TABLET, FILM COATED ORAL at 01:28

## 2019-06-07 RX ADMIN — AMOXICILLIN 500 MG: 500 CAPSULE ORAL at 12:59

## 2019-06-07 RX ADMIN — QUETIAPINE 100 MG: 100 TABLET, FILM COATED ORAL at 17:09

## 2019-06-07 RX ADMIN — LITHIUM CARBONATE 600 MG: 300 TABLET, EXTENDED RELEASE ORAL at 20:45

## 2019-06-07 RX ADMIN — GABAPENTIN 400 MG: 400 CAPSULE ORAL at 20:45

## 2019-06-07 RX ADMIN — ACETAMINOPHEN 650 MG: 325 TABLET, FILM COATED ORAL at 07:54

## 2019-06-07 RX ADMIN — AMOXICILLIN 500 MG: 500 CAPSULE ORAL at 17:09

## 2019-06-07 RX ADMIN — AMOXICILLIN 500 MG: 500 CAPSULE ORAL at 08:06

## 2019-06-07 RX ADMIN — ACETAMINOPHEN 650 MG: 325 TABLET, FILM COATED ORAL at 00:39

## 2019-06-07 RX ADMIN — IBUPROFEN 600 MG: 600 TABLET ORAL at 08:06

## 2019-06-07 RX ADMIN — TRAMADOL HYDROCHLORIDE 50 MG: 50 TABLET, COATED ORAL at 20:45

## 2019-06-07 RX ADMIN — OMEGA-3 FATTY ACIDS CAP DELAYED RELEASE 1000 MG 2 G: 1000 CAPSULE DELAYED RELEASE at 08:06

## 2019-06-07 RX ADMIN — GABAPENTIN 400 MG: 400 CAPSULE ORAL at 08:06

## 2019-06-07 RX ADMIN — FUROSEMIDE 20 MG: 20 TABLET ORAL at 08:06

## 2019-06-07 RX ADMIN — OLANZAPINE 10 MG: 10 TABLET, FILM COATED ORAL at 00:39

## 2019-06-07 RX ADMIN — GABAPENTIN 400 MG: 400 CAPSULE ORAL at 13:00

## 2019-06-07 ASSESSMENT — ACTIVITIES OF DAILY LIVING (ADL)
HYGIENE/GROOMING: INDEPENDENT
ORAL_HYGIENE: INDEPENDENT
LAUNDRY: UNABLE TO COMPLETE
DRESS: SCRUBS (BEHAVIORAL HEALTH);INDEPENDENT

## 2019-06-07 NOTE — PLAN OF CARE
Problem: Fall Injury Risk  Goal: Absence of Fall and Fall-Related Injury  6/6/2019 2125 by Julio Barreto, RN  Outcome: Improving  Pt has been steady on his feet     Problem: Thought Process Alteration  Goal: Optimal Thought Clarity  Description  Pt will be able to have rational conversations with no delusional content by discharge  Pt will exhibit appropriate boundaries with peers and staff  Pt will take medications as prescribed   6/6/2019 2125 by Julio Barreto, RN  Outcome: Improving  Pt's thinking is logical and less tangential this evening. He remains grandiose.     Problem: Adult Behavioral Health Plan of Care  Goal: Patient-Specific Goal (Individualization)  Description  Pt will attend 50% of unit programming  Pt will maintain appropriate hygiene  Pt will complete ADL's independently   6/6/2019 2125 by Julio Barreto, RN  Outcome: Improving  Pt was up and active until about 2100. He attended all of the available groups and is social with peers. He is struggling with significant tooth pain and received Tylenol twice this shift along with the scheduled Ibuprofen. He reported some pain relief.  Pt talked with me about his long depressive episode this winter and his struggle to even get out of his apartment.  Pt is hopeful that the Lithium will help him remain more balanced. He continues to be somewhat grandiose but his train of thought is more organized. He went to bed at 2100.    Face to face end of shift report communicated to Charline.     Julio Barreto  6/6/2019  2300

## 2019-06-07 NOTE — DISCHARGE INSTRUCTIONS
"Behavioral Discharge Planning and Instructions    Summary: Дмитрий \"Tristan\" was admitted to  with increased mental health symptoms     Main Diagnosis: Bipolar I Disorder, currently manic    Major Treatments, Procedures and Findings: Stabilize with medications, connect with community programs.    Symptoms to Report: feeling more aggressive, increased confusion, losing more sleep, mood getting worse or thoughts of suicide    Lifestyle Adjustment: Take all medications as prescribed, meet with doctor/ medication provider, out patient therapist, , and [facility] worker as scheduled. Abstain from alcohol or any unprescribed drugs.    Psychiatry Follow-up:     [facility]   Med management - Claudia Bojorquez - message left on 6/6/2019  [address]  [place] [address]   Phone: [phone]   Fax: [phone]    [facility]   - Rachel - [phone] - Wednesdays   Med Management - Gail Lauren 6/19 @ 1:30 (in place of meeting with Rachel)  [address], [address]  [place] [address]  Phone [phone]  Fax [phone]    Per  Rachel:  Patient has a dental appointment for tooth extraction on Tuesday the 11th at 0900. A volunteer  is set up to pick him up at 0815 at his apartment.       Resources:   Crisis Intervention: 491.299.5927 or 207-469-7973 (TTY: 256.748.3505).  Call anytime for help.  National [place] on Mental Illness (www.[place].immanuel.org): 740.702.5304 or 046-437-8379.  Alcoholics Anonymous (www.alcoholics-anonymous.org): Check your phone book for your local chapter.  Suicide Awareness Voices of Education (SAVE) (www.save.org): 129-877-KXON (7031)  National Suicide Prevention Line (www.mentalhealthmn.org): 640-105-ERTT (2860)  Mental Health Consumer/Survivor Network of [place] (www.mhcsn.net): 897.766.1455 or 509-281-1348  Mental Health Association of [place] (www.mentalhealth.org): 641.116.4681 or 388-471-0288    General Medication Instructions:   See your medication sheet(s) for " instructions.   Take all medicines as directed.  Make no changes unless your doctor suggests them.   Go to all your doctor visits.  Be sure to have all your required lab tests. This way, your medicines can be refilled on time.  Do not use any drugs not prescribed by your doctor.  Avoid alcohol.

## 2019-06-07 NOTE — PLAN OF CARE
Face to Face report received from JOE Robin.  Rounding completed. Patient observed in room.   Problem: Adult Behavioral Health Plan of Care  Goal: Patient-Specific Goal (Individualization)  Description  Pt will attend 50% of unit programming  Pt will maintain appropriate hygiene  Pt will complete ADL's independently   6/7/2019 0730 by Rafael Rhodes, RN  Outcome: Improving  He is up on the unit and is social with others. He continues to be grandiose and condescending in conversation.   He has multiple papers and is continually organizing and reorganizing them. He asks for help filling out various paperwork, he is refered to his  Rachel.  He is frustrated with other patients that he feels are intrusive. He can also be intrusive but doesn't recognize that. He answers questions with questions. He is encouraged to shower and is unkempt. He is writing multiple letters and asking staff to look up various addresses. He has multiple letters that he is wanting to mail. The letters are placed in his belongings so he can mail them when he gets home.   Problem: Thought Process Alteration  Goal: Optimal Thought Clarity  Description  Pt will be able to have rational conversations with no delusional content by discharge  Pt will exhibit appropriate boundaries with peers and staff  Pt will take medications as perscribed   6/7/2019 0730 by Rafael Rhodes, RN  Outcome: Improving  He is taking medications as prescribed. He is grandiose. His speech is less pressured.      Problem: Fall Injury Risk  Goal: Absence of Fall and Fall-Related Injury  6/7/2019 0730 by Rafael Rhodes, RN  Outcome: Improving  He is ambulating on the unit and is steady. He uses hand rails and has a call bell at bedside. He has no complaints of dizziness or weakness.    Face to face end of shift report to be communicated to evening shift RN.     Rafael Rhodes  6/7/2019  2:28 PM

## 2019-06-07 NOTE — PLAN OF CARE
BEHAVIORAL TEAM DISCUSSION    Participants: Sarai Bell NP, Lis Morse [facility], Joelle Fraust[facility], Hyun David RN, Rafael Rhodes RN, Alma Lee RN, Sarai Foy OT  Progress: minimal  Continued Stay Criteria/Rationale: grandiose, labile, tangential  Medical/Physical: LE edema - started Lasix and monitoring, tooth pain - scheduled meds ordered  Precautions:   Falls precaution?: YES, care plan in place   Behavioral Orders   Procedures    Code 1 - Restrict to Unit    Routine Programming     As clinically indicated    Self Injury Precaution    Status 15     Every 15 minutes.     Plan: started Lithium and continue to stabilize  Rationale for change in precautions or plan: None    Active Problems:    Bipolar disorder, current episode manic without psychotic features, severe (H)      Current Facility-Administered Medications:     acetaminophen (TYLENOL) tablet 650 mg, 650 mg, Oral, Q4H PRN, Kaci Santana Mc, APRN CNP, 650 mg at 06/07/19 0754    alum & mag hydroxide-simethicone (MYLANTA ES/MAALOX  ES) suspension 30 mL, 30 mL, Oral, Q4H PRN, Kaci Santana Mc, APRN CNP    amoxicillin (AMOXIL) capsule 500 mg, 500 mg, Oral, TID, Sarai Bell APRN CNP, 500 mg at 06/07/19 0806    fish oil-omega-3 fatty acids capsule 2 g, 2 g, Oral, Daily, Sarai Bell APRN CNP, 2 g at 06/07/19 0806    furosemide (LASIX) tablet 20 mg, 20 mg, Oral, Daily, Sarai Bell APRN CNP, 20 mg at 06/07/19 0806    gabapentin (NEURONTIN) capsule 400 mg, 400 mg, Oral, TID, Sarai Bell APRN CNP, 400 mg at 06/07/19 0806    hydrOXYzine (ATARAX) tablet 30-50 mg, 30-50 mg, Oral, Q4H PRN, Kaci Santana Mc, APRN CNP    ibuprofen (ADVIL/MOTRIN) tablet 600 mg, 600 mg, Oral, BID, Sarai Blel APRN CNP, 600 mg at 06/07/19 0806    lithium ER (LITHOBID) CR tablet 300 mg, 300 mg, Oral, At Bedtime, Sarai Bell, WESLEY CNP, 300 mg at 06/06/19 2001    magnesium hydroxide (MILK OF MAGNESIA) suspension 30 mL, 30 mL, Oral,  At Bedtime PRN, Kaci Santana Mc, APRN CNP    melatonin tablet 3 mg, 3 mg, Oral, At Bedtime PRN, Sarai Bell APRN CNP, 3 mg at 06/06/19 2001    nicotine (NICORETTE) gum 2-4 mg, 2-4 mg, Buccal, Q1H PRN, Kaci Santana Mc, APRN CNP    OLANZapine (zyPREXA) tablet 10 mg, 10 mg, Oral, BID PRN, 10 mg at 06/07/19 0039 **OR** OLANZapine (zyPREXA) injection 10 mg, 10 mg, Intramuscular, BID PRN, Kaci Santana Mc, APRN CNP    QUEtiapine (SEROquel) tablet 100 mg, 100 mg, Oral, TID PRN, Sarai Bell APRN CNP, 100 mg at 06/07/19 0128

## 2019-06-07 NOTE — PROGRESS NOTES
During exercise group patient noted that he has been having issues with another patient. He stated that this patient had asked him the last time he had gotten laid to which he replied it was none of his business. He does state that he feels that he could 'take' the patient if he needed to. He then notes that the other patient is likely wiry. Did discuss keeping his space and leaving/walking away if need be. Patient notes that this is similar advice he had gotten from nursing. He did state that the other patient is showing him more respect today and calling him sir. Charge RN notified of conversation.

## 2019-06-07 NOTE — PLAN OF CARE
Problem: Adult Behavioral Health Plan of Care  Goal: Patient-Specific Goal (Individualization)  Description  Pt will attend 50% of unit programming  Pt will maintain appropriate hygiene  Pt will complete ADL's independently   6/7/2019 0001 by Sharda Pyle, RN  Outcome: No Change     Problem: Thought Process Alteration  Goal: Optimal Thought Clarity  Description  Pt will be able to have rational conversations with no delusional content by discharge  Pt will exhibit appropriate boundaries with peers and staff  Pt will take medications as perscribed   6/7/2019 0001 by Sharda Pyle, RN  Outcome: No Change     Problem: Fall Injury Risk  Goal: Absence of Fall and Fall-Related Injury  6/7/2019 0001 by Sharda Pyle RN  Outcome: Improving     Face to face shift report recieved from Julio DIAZ. Rounding completed, pt observed.    Pt appeared to be sleeping at the beginning of this shift, normal respirations and position changes noted. Pt to nurses station and was given Tylenol 650 mg for 7/10 tooth pain along with Zyprexa 10 mg for racing thoughts and irritation at 0039. Pt sat out in lobby for a period of time. Pt to nurses station and given Seroquel 100 mg at 0128 per request for insomnia. Pt appeared to have returned to sleep after 0145 checks.  Pt has maintained appropriate boundaries with peers and staff this shift. Pt did not have any falls or injuries this shift.    Face to face report will be communicated to ariella DIAZ.    Sharda Pyle  6/7/2019  6:05 AM

## 2019-06-07 NOTE — PHARMACY
[facility]    Pharmacy      Antimicrobial Stewardship Note     Current antimicrobial therapy:  Anti-infectives (From now, onward)    Start     Dose/Rate Route Frequency Ordered Stop    06/05/19 1800  amoxicillin (AMOXIL) capsule 500 mg      500 mg Oral 3 TIMES DAILY. 06/05/19 1541 06/09/19 0759        Indication: Tooth infection/Abscess    Days of Therapy: 3     Pertinent labs:  Creatinine   Creatinine   Date Value Ref Range Status   04/17/2018 1.08 0.70 - 1.20 mg/dL Final   10/19/2015 1.2 0.7 - 1.3 mg/dL Final     WBC No results found for: WBC  Procalcitonin No results found for: PCAL  CRP No results found for: CRP    Culture Results: None     Recommendations/Interventions:  1. No recommendations at this time.     Lee Lane  June 7, 2019

## 2019-06-08 PROCEDURE — 12400000 ZZH R&B MH

## 2019-06-08 PROCEDURE — 99232 SBSQ HOSP IP/OBS MODERATE 35: CPT | Performed by: NURSE PRACTITIONER

## 2019-06-08 PROCEDURE — 25000132 ZZH RX MED GY IP 250 OP 250 PS 637: Performed by: NURSE PRACTITIONER

## 2019-06-08 RX ORDER — MECLIZINE HCL 12.5 MG 12.5 MG/1
12.5-25 TABLET ORAL 3 TIMES DAILY PRN
Status: DISCONTINUED | OUTPATIENT
Start: 2019-06-08 | End: 2019-06-10 | Stop reason: HOSPADM

## 2019-06-08 RX ORDER — MECLIZINE HCL 12.5 MG 12.5 MG/1
12.5 TABLET ORAL 3 TIMES DAILY PRN
Status: DISCONTINUED | OUTPATIENT
Start: 2019-06-08 | End: 2019-06-08

## 2019-06-08 RX ORDER — QUETIAPINE 150 MG/1
150 TABLET, FILM COATED, EXTENDED RELEASE ORAL
Status: DISCONTINUED | OUTPATIENT
Start: 2019-06-08 | End: 2019-06-08

## 2019-06-08 RX ORDER — OLANZAPINE 5 MG/1
5 TABLET ORAL 2 TIMES DAILY PRN
Status: DISCONTINUED | OUTPATIENT
Start: 2019-06-08 | End: 2019-06-10 | Stop reason: HOSPADM

## 2019-06-08 RX ORDER — OLANZAPINE 10 MG/2ML
5 INJECTION, POWDER, FOR SOLUTION INTRAMUSCULAR 2 TIMES DAILY PRN
Status: DISCONTINUED | OUTPATIENT
Start: 2019-06-08 | End: 2019-06-10 | Stop reason: HOSPADM

## 2019-06-08 RX ADMIN — AMOXICILLIN 500 MG: 500 CAPSULE ORAL at 08:31

## 2019-06-08 RX ADMIN — OLANZAPINE 10 MG: 10 TABLET, FILM COATED ORAL at 00:26

## 2019-06-08 RX ADMIN — GABAPENTIN 400 MG: 400 CAPSULE ORAL at 21:52

## 2019-06-08 RX ADMIN — AMOXICILLIN 500 MG: 500 CAPSULE ORAL at 17:27

## 2019-06-08 RX ADMIN — GABAPENTIN 400 MG: 400 CAPSULE ORAL at 08:31

## 2019-06-08 RX ADMIN — OLANZAPINE 5 MG: 5 TABLET, FILM COATED ORAL at 21:52

## 2019-06-08 RX ADMIN — ACETAMINOPHEN 650 MG: 325 TABLET, FILM COATED ORAL at 10:10

## 2019-06-08 RX ADMIN — ACETAMINOPHEN 650 MG: 325 TABLET, FILM COATED ORAL at 17:27

## 2019-06-08 RX ADMIN — TRAMADOL HYDROCHLORIDE 50 MG: 50 TABLET, COATED ORAL at 19:37

## 2019-06-08 RX ADMIN — LITHIUM CARBONATE 600 MG: 300 TABLET, EXTENDED RELEASE ORAL at 21:52

## 2019-06-08 RX ADMIN — MECLIZINE HCL 12.5 MG 12.5 MG: 12.5 TABLET ORAL at 14:31

## 2019-06-08 RX ADMIN — GABAPENTIN 400 MG: 400 CAPSULE ORAL at 14:31

## 2019-06-08 RX ADMIN — FUROSEMIDE 20 MG: 20 TABLET ORAL at 08:31

## 2019-06-08 RX ADMIN — ACETAMINOPHEN 650 MG: 325 TABLET, FILM COATED ORAL at 00:26

## 2019-06-08 RX ADMIN — OMEGA-3 FATTY ACIDS CAP DELAYED RELEASE 1000 MG 2 G: 1000 CAPSULE DELAYED RELEASE at 08:31

## 2019-06-08 RX ADMIN — AMOXICILLIN 500 MG: 500 CAPSULE ORAL at 12:33

## 2019-06-08 RX ADMIN — MELATONIN TAB 3 MG 6 MG: 3 TAB at 21:52

## 2019-06-08 ASSESSMENT — ACTIVITIES OF DAILY LIVING (ADL)
DRESS: INDEPENDENT;SCRUBS (BEHAVIORAL HEALTH)
LAUNDRY: UNABLE TO COMPLETE
DRESS: SCRUBS (BEHAVIORAL HEALTH);INDEPENDENT
HYGIENE/GROOMING: INDEPENDENT
ORAL_HYGIENE: INDEPENDENT
ORAL_HYGIENE: INDEPENDENT
LAUNDRY: UNABLE TO COMPLETE
HYGIENE/GROOMING: INDEPENDENT

## 2019-06-08 NOTE — PLAN OF CARE
Problem: Adult Behavioral Health Plan of Care  Goal: Patient-Specific Goal (Individualization)  Description  Pt will attend 50% of unit programming  Pt will maintain appropriate hygiene  Pt will complete ADL's independently   6/7/2019 2341 by Sharda Pyle, RN  Outcome: No Change     Problem: Thought Process Alteration  Goal: Optimal Thought Clarity  Description  Pt will be able to have rational conversations with no delusional content by discharge  Pt will exhibit appropriate boundaries with peers and staff  Pt will take medications as perscribed   6/7/2019 2341 by Sharda Pyle, RN  Outcome: No Change     Problem: Fall Injury Risk  Goal: Absence of Fall and Fall-Related Injury  6/7/2019 2341 by Sharda Pyle, RN  Outcome: Improving     Face to face shift report recieved from Julio DIAZ. Rounding completed, pt observed.    Pt awake at the beginning of this shift. Pt in and out of room and lobby. Pt given Tylenol 650 mg for 7/10 tooth pain and Zyprexa 10 mg at 0026 per request. Pt sat in lobby playing cribbage for a period of time with a peer. Pt appeared to be sleeping after 0215 checks, normal respirations and position changes noted. Pt awake and out in lobby after morning vitals.  Pt has maintained appropriate boundaries with peers and staff this shift. Pt has not had any falls or injury this shift.     Face to face report will be communicated to ariella DIAZ.    Sharda Pyle  6/8/2019  6:15 AM

## 2019-06-08 NOTE — PROGRESS NOTES
"[facility]  Psychiatric Progress Note      Impression:   (per Admit/ED) Pt presents to ED with c/o worsening manic symptoms over the past week and inability to sleep for 3-4 days. Pt states that his lack of sleep is in part due to his neighbors being too loud, and as a result, pt has had thoughts of wanting to \"get in their faces\". Pt reports that he brought himself to the ED for stabilization of his enzo instead of confronting his neighbors- pt is requesting admission.      Patient up in commons area, pleasant and talkative.  He complains of feeling dizzy last night and still feels that way, is not sure why.  Maybe the Seroquel or Zyprexa he had last night.  Will decrease Zyprexa and discontinue Seroquel.  Advise patient he monitor this and rest today, vitals are WNL.  Will order walker for additional precaution.  Patient otherwise is in good spirits, continues with tangential speech, although improved.  We agree to no other medication changes.  His edema is also improving.  He denies suicidal or homicidal thoughts.    Educated regarding medication indications, risks, benefits, side effects, contraindications and possible interactions. Verbally expressed understanding.        DIagnoses:   Bipolar I Disorder, currently manic             Plan:   Continue Lithium 600 mg at HS  Continue Lasix for edema  Antibiotic for tooth abscess  Discontinue Ibuprofen and start Tramadol for pain  Discontinue Seroquel   Adding meclizine prn  Low sodium diet    ELOS:  3-5 days for mood stabilization     Attestation:  Patient has been seen and evaluated by me,  Sarai Bell, WESLEY CNP          Interim History:   The patient's care was discussed with the treatment team and chart notes were reviewed.          Medications:     Current Facility-Administered Medications Ordered in Epic   Medication Dose Route Frequency Last Rate Last Dose     acetaminophen (TYLENOL) tablet 650 mg  650 mg Oral Q4H PRN   650 mg at " 06/08/19 1010     alum & mag hydroxide-simethicone (MYLANTA ES/MAALOX  ES) suspension 30 mL  30 mL Oral Q4H PRN         amoxicillin (AMOXIL) capsule 500 mg  500 mg Oral TID   500 mg at 06/08/19 0831     artificial saliva (BIOTENE MT) solution 2 spray  2 spray Mouth/Throat 4x Daily PRN         fish oil-omega-3 fatty acids capsule 2 g  2 g Oral Daily   2 g at 06/08/19 0831     furosemide (LASIX) tablet 20 mg  20 mg Oral Daily   20 mg at 06/08/19 0831     gabapentin (NEURONTIN) capsule 400 mg  400 mg Oral TID   400 mg at 06/08/19 0831     hydrOXYzine (ATARAX) tablet 30-50 mg  30-50 mg Oral Q4H PRN         lithium ER (LITHOBID) CR tablet 600 mg  600 mg Oral At Bedtime   600 mg at 06/07/19 2045     magnesium hydroxide (MILK OF MAGNESIA) suspension 30 mL  30 mL Oral At Bedtime PRN         melatonin tablet 6 mg  6 mg Oral At Bedtime PRN         naloxone (NARCAN) injection 0.1-0.4 mg  0.1-0.4 mg Intravenous Q2 Min PRN         nicotine (NICORETTE) gum 2-4 mg  2-4 mg Buccal Q1H PRN         OLANZapine (zyPREXA) tablet 5 mg  5 mg Oral BID PRN        Or     OLANZapine (zyPREXA) injection 5 mg  5 mg Intramuscular BID PRN         QUEtiapine (SEROquel XR) 24 hr tablet 150 mg  150 mg Oral At Bedtime PRN         traMADol (ULTRAM) tablet 50 mg  50 mg Oral Q6H PRN   50 mg at 06/07/19 2045     No current [facility]-ordered outpatient medications on file.          10 point ROS - edema       Allergies:   No Known Allergies         Psychiatric Examination:   /78   Pulse 99   Temp 98.2  F (36.8  C) (Tympanic)   Resp 14   Ht 1.829 m (6')   Wt 95.8 kg (211 lb 1.6 oz)   SpO2 98%   BMI 28.63 kg/m    Weight is 211 lbs 1.6 oz  Body mass index is 28.63 kg/m .    Appearance:  awake, alert  Attitude:  cooperative  Eye Contact:  good  Mood:  elevated, grandiose  Affect:  intensity is heightened  Speech:  pressured speech  Psychomotor Behavior:  no evidence of tardive dyskinesia, dystonia, or tics  Thought Process:  tangental  Associations:   loosening of associations present  Thought Content:  no evidence of suicidal ideation or homicidal ideation and no evidence of psychotic thought  Insight:  fair  Judgment:  fair  Oriented to:  time, person, and place  Attention Span and Concentration:  intact  Recent and Remote Memory:  intact  Fund of Knowledge: appropriate  Muscle Strength and Tone: normal  Gait and Station: Normal           Labs:     Unremarkable

## 2019-06-08 NOTE — PLAN OF CARE
Problem: Adult Behavioral Health Plan of Care  Goal: Patient-Specific Goal (Individualization)  Description  Pt will attend 50% of unit programming  Pt will maintain appropriate hygiene  Pt will complete ADL's independently   Outcome: Improving     Problem: Adult Behavioral Health Plan of Care  Goal: Adheres to Safety Considerations for Self and Others  Outcome: Improving     Problem: Adult Behavioral Health Plan of Care  Goal: Optimized Coping Skills in Response to Life Stressors  Outcome: Improving     Problem: Adult Behavioral Health Plan of Care  Goal: Develops/Participates in Therapeutic [place] to Support Successful Transition  Outcome: Improving     Problem: Thought Process Alteration  Goal: Optimal Thought Clarity  Description  Pt will be able to have rational conversations with no delusional content by discharge  Pt will exhibit appropriate boundaries with peers and staff  Pt will take medications as perscribed   Outcome: Improving     Problem: Fall Injury Risk  Goal: Absence of Fall and Fall-Related Injury  Outcome: Improving    Patient denies anxiety, SI, HI, hallucinations, admits to some depression as well as pain. Depression, patient states his family has lots of crisis in at this time, states his daughters  has recently passed in a motorcycle accident. Patient notes pain to the right side of his mouth and down his throat, tylenol is given with relief. Patient does have some edema noted tot he LLE, and some cramping is noted, patient does go to lie down, negative Homans is noted. Patient is up a while later and doing well, patient is ambulating with walker on unit and doing well. Patient is out and about the unit in lounge and interacting with staff and other patients.     End of shift hand of report given to Julio DIAZ.

## 2019-06-08 NOTE — PLAN OF CARE
Problem: Fall Injury Risk  Goal: Absence of Fall and Fall-Related Injury  6/7/2019 2122 by Julio Barreto, RN  Outcome: Improving  Pt has been steady on his feet.  He declines to use a walker     Problem: Thought Process Alteration  Goal: Optimal Thought Clarity  Description  Pt will be able to have rational conversations with no delusional content by discharge  Pt will exhibit appropriate boundaries with peers and staff  Pt will take medications as perscribed   6/7/2019 2122 by Julio Barreto, RN  Outcome: Improving  Pt is able to have rational conversation but continues to have some tangential thinking and grandiose delusions.  Pt is taking medications as perscribed     Problem: Adult Behavioral Health Plan of Care  Goal: Patient-Specific Goal (Individualization)  Description  Pt will attend 50% of unit programming  Pt will maintain appropriate hygiene  Pt will complete ADL's independently   6/7/2019 2122 by Julio Barreto, RN  Outcome: Improving  Pt was up and active this shift. 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  I discussed with him that he needed to focus on himself and take care of his mental health before he can help others.  Pt did admit that he was scattered and not really focusing on what he needed to be doing. His swollen left lower leg was past +3 pitting edema at the beginning of the afternoon.  I gave him PRN Tylenol and a PRN Seroquel 100 mg at 1705.  Pt did lay down for about an hour with his feet elevated. He was a bit less pressured and less tangential when he came out to attend groups.  Pt was given PRN Tramadol for his pain with his HS medications.  We also discussed his dosage increase of Lithium and the starting of Scheduled Seroquel.  Pt is hopeful that he will be able to sleep the whole night.     Face to face end of shift report communicated to Stephanie.      Julio Barreto  6/7/2019  2300

## 2019-06-08 NOTE — PLAN OF CARE
"1010: PRN acetaminophen 650 mg PO taken by patient for \"7\" on a 0-10 numeric scale. Generalized pain.   "

## 2019-06-09 PROCEDURE — 25000132 ZZH RX MED GY IP 250 OP 250 PS 637: Performed by: NURSE PRACTITIONER

## 2019-06-09 PROCEDURE — 12400000 ZZH R&B MH

## 2019-06-09 PROCEDURE — 99232 SBSQ HOSP IP/OBS MODERATE 35: CPT | Performed by: NURSE PRACTITIONER

## 2019-06-09 RX ORDER — AMOXICILLIN 500 MG/1
500 CAPSULE ORAL
Status: DISCONTINUED | OUTPATIENT
Start: 2019-06-09 | End: 2019-06-10 | Stop reason: HOSPADM

## 2019-06-09 RX ADMIN — AMOXICILLIN 500 MG: 500 CAPSULE ORAL at 20:26

## 2019-06-09 RX ADMIN — OMEGA-3 FATTY ACIDS CAP DELAYED RELEASE 1000 MG 2 G: 1000 CAPSULE DELAYED RELEASE at 08:43

## 2019-06-09 RX ADMIN — ACETAMINOPHEN 650 MG: 325 TABLET, FILM COATED ORAL at 14:19

## 2019-06-09 RX ADMIN — TRAMADOL HYDROCHLORIDE 50 MG: 50 TABLET, COATED ORAL at 15:53

## 2019-06-09 RX ADMIN — MELATONIN TAB 3 MG 6 MG: 3 TAB at 20:26

## 2019-06-09 RX ADMIN — AMOXICILLIN 500 MG: 500 CAPSULE ORAL at 15:53

## 2019-06-09 RX ADMIN — ACETAMINOPHEN 650 MG: 325 TABLET, FILM COATED ORAL at 18:37

## 2019-06-09 RX ADMIN — GABAPENTIN 400 MG: 400 CAPSULE ORAL at 08:43

## 2019-06-09 RX ADMIN — FUROSEMIDE 20 MG: 20 TABLET ORAL at 08:43

## 2019-06-09 RX ADMIN — GABAPENTIN 400 MG: 400 CAPSULE ORAL at 13:54

## 2019-06-09 RX ADMIN — LITHIUM CARBONATE 600 MG: 300 TABLET, EXTENDED RELEASE ORAL at 20:26

## 2019-06-09 RX ADMIN — GABAPENTIN 400 MG: 400 CAPSULE ORAL at 20:26

## 2019-06-09 RX ADMIN — TRAMADOL HYDROCHLORIDE 50 MG: 50 TABLET, COATED ORAL at 08:48

## 2019-06-09 ASSESSMENT — ACTIVITIES OF DAILY LIVING (ADL)
LAUNDRY: UNABLE TO COMPLETE
HYGIENE/GROOMING: INDEPENDENT
ORAL_HYGIENE: INDEPENDENT
HYGIENE/GROOMING: INDEPENDENT
DRESS: SCRUBS (BEHAVIORAL HEALTH);INDEPENDENT
DRESS: SCRUBS (BEHAVIORAL HEALTH);INDEPENDENT
LAUNDRY: UNABLE TO COMPLETE
ORAL_HYGIENE: INDEPENDENT

## 2019-06-09 ASSESSMENT — MIFFLIN-ST. JEOR: SCORE: 1786

## 2019-06-09 NOTE — PLAN OF CARE
Problem: Thought Process Alteration  Goal: Optimal Thought Clarity  Description  Pt will be able to have rational conversations with no delusional content by discharge  Pt will exhibit appropriate boundaries with peers and staff  Pt will take medications as prescribed   6/8/2019 2100 by Julio Barreto, RN  Outcome: Improving   Pt has been relaxed and had rational conversation that is not tangential or pressured.    Problem: Fall Injury Risk  Goal: Absence of Fall and Fall-Related Injury  6/8/2019 2100 by Julio Barreto, RN  Outcome: Improving    Pt is steady on his feet, used the walker when up, and given teaching on changing position slowly to prevent dizzy spells.     Problem: Adult Behavioral Health Plan of Care  Goal: Adheres to Safety Considerations for Self and Others  6/8/2019 1509 by Quynh Cummins, RN  Outcome: Improving  Pt has used his walker and had appropriate interactions with peers and staff. He told me that he is feeling more organized and less scattered. He has had periods of feeling dizzy and was given teaching about making position changes slowly.  Pt was reminded to use his call bell if he felt more unsteady.  Pt's edema in his left leg was significantly reduced. He had Tylenol early in the shift and Tramadol  with his HS medications for tooth pain with good relief.  His rate of speech has slowed dramatically.  He went to bed at 2100.    2200 Pt given PRN Zyprexa 5 mg with his HS medication per his request.  Pt woke from his nap at 2200 and thought it was morning.     Face to face end of shift report communicated to Sharda and Lavonne Morales.     Juilo Barreto  6/8/2019  2300

## 2019-06-09 NOTE — PLAN OF CARE
Problem: Adult Behavioral Health Plan of Care  Goal: Patient-Specific Goal (Individualization)  Description  Pt will attend 50% of unit programming  Pt will maintain appropriate hygiene  Pt will complete ADL's independently   Outcome: Improving     Problem: Adult Behavioral Health Plan of Care  Goal: Adheres to Safety Considerations for Self and Others  Outcome: Improving     Problem: Adult Behavioral Health Plan of Care  Goal: Optimized Coping Skills in Response to Life Stressors  Outcome: Improving     Problem: Adult Behavioral Health Plan of Care  Goal: Develops/Participates in Therapeutic [place] to Support Successful Transition  Outcome: Improving     Problem: Thought Process Alteration  Goal: Optimal Thought Clarity  Description  Pt will be able to have rational conversations with no delusional content by discharge  Pt will exhibit appropriate boundaries with peers and staff  Pt will take medications as perscribed   Outcome: Improving     Problem: Fall Injury Risk  Goal: Absence of Fall and Fall-Related Injury  Outcome: Improving     Patient denies SI, HI, hallucinations, admits to pain in the left side of mouth, tooth pain, and overall generalized pain. Patient does take medication and this does give him some relief. Patient is out and about the unit, he does attend the group room activities, and participates. Patient eats meals out in the lounge, and interacts with other patients, as well as staff. Patient continues to be condescending with some staff, has grandiose attitude. Patient spends most of the day writing in his notebooks, and going over his personal papers. Patient speaks with provider today. Patient does not use his walker for the most part today, patient states that he only needs it at this time if he begins to feels dizzy, and at this time he does not. Patient was educated on the need to use walker for his safety, patient states he knows when he needs to use the walker. Patient is pleasant  and compliant with nurse assess.     End of shift hand off report given to Julio DIAZ

## 2019-06-09 NOTE — PROGRESS NOTES
"[facility]  Psychiatric Progress Note      Impression:   (per Admit/ED) Pt presents to ED with c/o worsening manic symptoms over the past week and inability to sleep for 3-4 days. Pt states that his lack of sleep is in part due to his neighbors being too loud, and as a result, pt has had thoughts of wanting to \"get in their faces\". Pt reports that he brought himself to the ED for stabilization of his enzo instead of confronting his neighbors- pt is requesting admission.      Patient reports doing well, edema is much improved and not feeling as dizzy - he reminds me that the dizziness is nothing new and has been seen in PC for this.  Patient has dentist appointment scheduled for Tuesday, which means he would need to discharge tomorrow then, needs a ride to [facility].  Patient reports he feels he is ready to go home and has \"other stuff to do\".  Inform patient that he will need follow up appointment scheduled as his Lithium is not likely in the therapeutic range and should be able to draw a lab in 2 more days.  Patient agrees with this plan and feels he is ready and stable.   He denies suicidal or homicidal thoughts and feels better than when he came in.  Patient does presents as continued lability and grandiose, this could be in part due to personality disorder.    Educated regarding medication indications, risks, benefits, side effects, contraindications and possible interactions. Verbally expressed understanding.        DIagnoses:   Bipolar I Disorder, currently manic  Cluster B Traits           Plan:   Continue Lithium 600 mg at HS  Continue Lasix for edema  Antibiotic for tooth abscess  Discontinue Ibuprofen and start Tramadol for pain  Discontinue Seroquel   Adding meclizine prn  Low sodium diet    ELOS:  3-5 days for mood stabilization     Attestation:  Patient has been seen and evaluated by me,  Sarai Bell, APRN CNP          Interim History:   The patient's care was discussed with the " treatment team and chart notes were reviewed.          Medications:     Current Facility-Administered Medications Ordered in Epic   Medication Dose Route Frequency Last Rate Last Dose     acetaminophen (TYLENOL) tablet 650 mg  650 mg Oral Q4H PRN   650 mg at 06/08/19 1727     alum & mag hydroxide-simethicone (MYLANTA ES/MAALOX  ES) suspension 30 mL  30 mL Oral Q4H PRN         artificial saliva (BIOTENE MT) solution 2 spray  2 spray Mouth/Throat 4x Daily PRN         fish oil-omega-3 fatty acids capsule 2 g  2 g Oral Daily   2 g at 06/08/19 0831     furosemide (LASIX) tablet 20 mg  20 mg Oral Daily   20 mg at 06/08/19 0831     gabapentin (NEURONTIN) capsule 400 mg  400 mg Oral TID   400 mg at 06/08/19 2152     hydrOXYzine (ATARAX) tablet 30-50 mg  30-50 mg Oral Q4H PRN         lithium ER (LITHOBID) CR tablet 600 mg  600 mg Oral At Bedtime   600 mg at 06/08/19 2152     magnesium hydroxide (MILK OF MAGNESIA) suspension 30 mL  30 mL Oral At Bedtime PRN         meclizine (ANTIVERT) tablet 12.5-25 mg  12.5-25 mg Oral TID PRN   12.5 mg at 06/08/19 1431     melatonin tablet 6 mg  6 mg Oral At Bedtime PRN   6 mg at 06/08/19 2152     naloxone (NARCAN) injection 0.1-0.4 mg  0.1-0.4 mg Intravenous Q2 Min PRN         nicotine (NICORETTE) gum 2-4 mg  2-4 mg Buccal Q1H PRN         OLANZapine (zyPREXA) tablet 5 mg  5 mg Oral BID PRN   5 mg at 06/08/19 2152    Or     OLANZapine (zyPREXA) injection 5 mg  5 mg Intramuscular BID PRN         traMADol (ULTRAM) tablet 50 mg  50 mg Oral Q6H PRN   50 mg at 06/08/19 1937     No current [facility]-ordered outpatient medications on file.          10 point ROS - edema       Allergies:   No Known Allergies         Psychiatric Examination:   /75   Pulse 85   Temp 98  F (36.7  C) (Tympanic)   Resp 12   Ht 1.829 m (6')   Wt 95.8 kg (211 lb 3.2 oz)   SpO2 98%   BMI 28.64 kg/m    Weight is 211 lbs 3.21 oz  Body mass index is 28.64 kg/m .    Appearance:  awake, alert  Attitude:  cooperative  Eye  Contact:  good  Mood:  elevated, grandiose  Affect:  intensity is heightened  Speech:  pressured speech  Psychomotor Behavior:  no evidence of tardive dyskinesia, dystonia, or tics  Thought Process:  tangental  Associations:  loosening of associations present  Thought Content:  no evidence of suicidal ideation or homicidal ideation and no evidence of psychotic thought  Insight:  fair  Judgment:  fair  Oriented to:  time, person, and place  Attention Span and Concentration:  intact  Recent and Remote Memory:  intact  Fund of Knowledge: appropriate  Muscle Strength and Tone: normal  Gait and Station: Normal           Labs:     Unremarkable

## 2019-06-09 NOTE — PLAN OF CARE
Problem: Adult Behavioral Health Plan of Care  Goal: Patient-Specific Goal (Individualization)  Description  Pt will attend 50% of unit programming  Pt will maintain appropriate hygiene  Pt will complete ADL's independently   6/8/2019 2330 by Sharda Pyle, RN  Outcome: No Change     Problem: Thought Process Alteration  Goal: Optimal Thought Clarity  Description  Pt will be able to have rational conversations with no delusional content by discharge  Pt will exhibit appropriate boundaries with peers and staff  Pt will take medications as perscribed   6/8/2019 2330 by Sharda Pyle, RN  Outcome: No Change     Problem: Fall Injury Risk  Goal: Absence of Fall and Fall-Related Injury  6/8/2019 2330 by Sharda Pyle, RN  Outcome: Improving    Face to face shift report recieved from Julio RN. Rounding completed, pt observed.    Pt appeared to be sleeping most of this shift, normal respirations and position changes noted. Pt did not have any falls or injuries this shift. Pt has maintained appropriate boundaries with peers and staff.     Face to face report will be communicated to oncoming RN.    Sharda Pyle  6/9/2019  6:24 AM

## 2019-06-10 VITALS
BODY MASS INDEX: 28.61 KG/M2 | WEIGHT: 211.2 LBS | HEART RATE: 88 BPM | SYSTOLIC BLOOD PRESSURE: 134 MMHG | TEMPERATURE: 98.2 F | OXYGEN SATURATION: 98 % | DIASTOLIC BLOOD PRESSURE: 73 MMHG | RESPIRATION RATE: 14 BRPM | HEIGHT: 72 IN

## 2019-06-10 PROCEDURE — 99239 HOSP IP/OBS DSCHRG MGMT >30: CPT | Performed by: NURSE PRACTITIONER

## 2019-06-10 PROCEDURE — 25000132 ZZH RX MED GY IP 250 OP 250 PS 637: Performed by: NURSE PRACTITIONER

## 2019-06-10 RX ORDER — LITHIUM CARBONATE 300 MG/1
600 TABLET, FILM COATED, EXTENDED RELEASE ORAL AT BEDTIME
Qty: 60 TABLET | Refills: 0 | Status: SHIPPED | OUTPATIENT
Start: 2019-06-10

## 2019-06-10 RX ORDER — AMOXICILLIN 500 MG/1
500 CAPSULE ORAL 3 TIMES DAILY
Qty: 15 CAPSULE | Refills: 0 | Status: SHIPPED | OUTPATIENT
Start: 2019-06-10 | End: 2019-06-15

## 2019-06-10 RX ORDER — GABAPENTIN 400 MG/1
400 CAPSULE ORAL 3 TIMES DAILY
Qty: 30 CAPSULE | Refills: 0 | Status: SHIPPED | OUTPATIENT
Start: 2019-06-10

## 2019-06-10 RX ORDER — FUROSEMIDE 20 MG
20 TABLET ORAL DAILY
Qty: 30 TABLET | Refills: 0 | Status: SHIPPED | OUTPATIENT
Start: 2019-06-11

## 2019-06-10 RX ADMIN — OMEGA-3 FATTY ACIDS CAP DELAYED RELEASE 1000 MG 2 G: 1000 CAPSULE DELAYED RELEASE at 08:23

## 2019-06-10 RX ADMIN — OLANZAPINE 5 MG: 5 TABLET, FILM COATED ORAL at 00:55

## 2019-06-10 RX ADMIN — FUROSEMIDE 20 MG: 20 TABLET ORAL at 08:23

## 2019-06-10 RX ADMIN — AMOXICILLIN 500 MG: 500 CAPSULE ORAL at 08:23

## 2019-06-10 RX ADMIN — GABAPENTIN 400 MG: 400 CAPSULE ORAL at 08:23

## 2019-06-10 RX ADMIN — TRAMADOL HYDROCHLORIDE 50 MG: 50 TABLET, COATED ORAL at 00:55

## 2019-06-10 ASSESSMENT — ACTIVITIES OF DAILY LIVING (ADL)
LAUNDRY: UNABLE TO COMPLETE
DRESS: SCRUBS (BEHAVIORAL HEALTH);INDEPENDENT
ORAL_HYGIENE: INDEPENDENT
HYGIENE/GROOMING: INDEPENDENT

## 2019-06-10 NOTE — PLAN OF CARE
Problem: Adult Behavioral Health Plan of Care  Goal: Patient-Specific Goal (Individualization)  Description  Pt will attend 50% of unit programming  Pt will maintain appropriate hygiene  Pt will complete ADL's independently   6/9/2019 2344 by Sharda Pyle, RN  Outcome: No Change     Problem: Thought Process Alteration  Goal: Optimal Thought Clarity  Description  Pt will be able to have rational conversations with no delusional content by discharge  Pt will exhibit appropriate boundaries with peers and staff  Pt will take medications as perscribed   6/9/2019 2344 by Sharda Pyle, RN  Outcome: No Change     Problem: Fall Injury Risk  Goal: Absence of Fall and Fall-Related Injury  6/9/2019 2344 by Sharda Pyle, RN  Outcome: Improving     Face to face shift report recieved from Julio DIAZ. Rounding completed, pt observed.    Pt appeared to be sleeping at the beginning of this shift, normal respirations and position changes noted. Pt to nurses station and was given Ultram 50 mg for 7/10 tooth pain and Zyprexa 5 mg for manic symptoms of pressure/rambling speech, inability to stay sleeping at 0055. Pt appeared to return to sleep from 2608-0840 and then back out in lobby. Pt again appeared to return sleep after  0500. Pt maintained appropriate boundaries with staff and peers this shift. Pt did not have any falls or injury this shift.     Face to face report will be communicated to ariella DIAZ.    Sharda Pyle  6/10/2019  6:00 AM

## 2019-06-10 NOTE — PLAN OF CARE
Discharge Note    Patient Discharged to home on 6/10/2019 10:49 AM via [employer] accompanied by  staff to door.     Patient informed of discharge instructions in AVS. patient verbalizes understanding and denies having any questions pertaining to AVS. Patient stable at time of discharge. Patient denies SI, HI, and thoughts of self harm at time of discharge. All personal belongings returned to patient. Discharge prescriptions sent to [facility] in [place], [place] via electronic communication. Psych evaluation, history and physical, AVS, and discharge summary faxed to next level of care- per .     Cherrie Sweeney  6/10/2019  10:24 AM

## 2019-06-10 NOTE — PLAN OF CARE
Problem: Fall Injury Risk  Goal: Absence of Fall and Fall-Related Injury  6/9/2019 2241 by Julio Barreto, RN  Outcome: Improving  Pt has been more steady on his feet using the walker sometimes. He told me that he has had less feelings of being dizziness.      Problem: Thought Process Alteration  Goal: Optimal Thought Clarity  Description  Pt will be able to have rational conversations with no delusional content by discharge  Pt will exhibit appropriate boundaries with peers and staff  Pt will take medications as perscribed   6/9/2019 2241 by Julio Barreto, RN  Outcome: Improving    Pt's thinking is organized but somewhat tangential.     Problem: Adult Behavioral Health Plan of Care  Goal: Patient-Specific Goal (Individualization)  Description  Pt will attend 50% of unit programming  Pt will maintain appropriate hygiene  Pt will complete ADL's independently   6/9/2019 2241 by Julio Barreto, RN  Outcome: Improving    Pt was up and active all shift. He did have significant tooth pain that was barely covered by the Tylenol and Tramadol.  Pt told me that he is grateful for his care here but is upset that his [place] feather was broken and his medicine bag was taken apart when his belongings were gone through.  He wrote a greivance about this. His thinking is still somewhat tangential but he is able to track conversation well.  Pt's edema has improved and he has been elevating his feet often. PT is hopeful about his discharge though wants to make sure that he has follow up appointments set up for him to get Lithium levels monitored and manage his medications.  PT went to bed at 2130.    Face to face end of shift report communicated to night shift.     Julio Barreto  6/9/2019  11:04 PM

## 2019-06-10 NOTE — PLAN OF CARE
"Face to face end of shift report received from Sharda NAIK RN.  Rounding complete. Patient observed in [place].     Cherrie Sweeney  6/10/2019  7:30 AM          Problem: Adult Behavioral Health Plan of Care  Goal: Patient-Specific Goal (Individualization)  Description  Pt will attend 50% of unit programming  Pt will maintain appropriate hygiene  Pt will complete ADL's independently   6/10/2019 0916 by Cherrie Sweeney, RN  Outcome: Improving  Note:   Patient denies SI, HI, hallucinations, depression.  Patient continues to have right tooth pain rated 5 out of 7.  Offered PRN, patient states he will just \"have it pulled tomorrow\".  Continues to have some lower left leg edema.  Compliant with medications.  Patient's speech is somewhat tangential but he does come back to the conversation well when redirected.  He is slightly irritable with staff when going through his belongings for discharge.      Problem: Thought Process Alteration  Goal: Optimal Thought Clarity  Description  Pt will be able to have rational conversations with no delusional content by discharge  Pt will exhibit appropriate boundaries with peers and staff  Pt will take medications as perscribed   6/10/2019 0916 by Cherrie Sweeney, RN  Outcome: Improving  Note:   Patient made no noted delusional statements this shift.  He is able to hold reality based conversation.  He is compliant with medications.      Problem: Fall Injury Risk  Goal: Absence of Fall and Fall-Related Injury  6/10/2019 0916 by Cherrie Sweeney, RN  Outcome: Improving  Note:   Patient's gait is balanced and steady.  He has no c/o dizziness.  He declines to use a walker for ambulation.  He is wearing non-skid socks.     "

## 2019-06-10 NOTE — DISCHARGE SUMMARY
"[facility]    Discharge Summary  Adult Psychiatry    Date of Admission:  6/4/2019  Date of Discharge:  6/10/2019  Discharging Provider: Sarai Bell    Discharge Diagnoses   Principal Discharge Diagnosis: Bipolar I Disorder, Currently Hypomanic  Secondary Discharge Diagnosis: Insomnia  Medical Diagnoses addressed this admission: Edema    History of Present Illness   (per Admit/ED) Pt presents to ED with c/o worsening manic symptoms over the past week and inability to sleep for 3-4 days. Pt states that his lack of sleep is in part due to his neighbors being too loud, and as a result, pt has had thoughts of wanting to \"get in their faces\". Pt reports that he brought himself to the ED for stabilization of his enzo instead of confronting his neighbors- pt is requesting admission. 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 Pt alert and oriented x4. Presents as somewhat irritable but cooperative. Elevated affect. Speech rapid and pressured. Pt tangential and jumps from topic to topic and difficult to follow at times. Pt reports that he had a psychiatrist in [place] but that this provider \"refused\" to continue services with him. Pt would not elaborate regarding why this occurred and jumped to another topic. Pt states he's on a \"whole bunch\" of meds but can only recall Lamictal and clonidine (pt is not actually on clonidine according to current med list). However, when pt was admitted to [facility] in April 2018, he was cross titrated off Lamictal and started on Depakote. Pt reports he's been compliant with outpatient medications though this is quite questionable, as pt is so dysregulated that he cannot confirm which meds " "he's prescribed. Denies alcohol or drug use. No acute medical concerns. VS: /92, HR 88, T 99.1, RR 16, O2 99%. Pt is ambulatory. Labs unremarkable. UDS negative.      (per H&P) Patient reports \"I'm just coming off a manic, so I have depression\".  He denies symptoms of anxiety reports sleeping ok last night, although has not been for this past week.  He reports he just got hired to teach, mentions his 4 college degrees which includes 2 Master's degrees.  He then talks about living in the [facility] and the people there have been bothering him, he lives in [place], and also mentions he is in the [email or web address].  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  He then talks about going through grief right now due to his daughter's boyfriend  in his sleep on May 1, he was 26 years old.  Patient reports he has been compliant with his medications - although we review and he could confirm gabapentin at 300mg and lamictal, although did not know what dose and states his last increase was 2 months ago.     Past Psychiatric History:  Patient was inpatient hospitalized for 8 days in 2018, stabilized on medications and discharged.  He has a history of bipolar disorder.  He reports one previous psychiatric hospitalization when he was in his 40's. He denies previous suicide attempts. He denies engaging in self-injurious behavior.  Patient reports seeing Dr. Bojorquez for psychiatry.     Previous psychotropic medication trials include:   Lamictal, clonazepam, Depakote, clonidine, gabapentin, seroquel, and trazodone.     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    Hospital Course   Patient able to stabilize for the most part during hospital stay.  We discontinued Lamictal and started Lithium, increase so far to 600 mg.  He will need to see his provider for further increase and draw lab, as patient remains " with hypomanic features, some tangential speech, although logical and linear.  Patient denies suicidal or homicidal ideation, has been social and appropriate on the unit with staff and peers.  At the time of discharge, there is no evidence this patient is in imminent danger of self harm or harming others and will be transported home.  Patient has follow up appointment scheduled with his providers, 30-day supply of new medications sent to his pharmacy in [place].        Sarai Bell    Significant Results and Procedures   None    Pending Results   None - will need follow up with Lithium adjustments/levels    Unresulted Labs Ordered in the Past 30 Days of this Admission     No orders found from 4/5/2019 to 6/5/2019.        Code Status   Full Code    Primary Care Physician   Marvin Hurst    Physical Exam   Appearance:  awake, alert  Attitude:  cooperative  Eye Contact:  good  Mood:  better and although continues to be a bit manic, will need further adjustments with lithium  Affect:  mood congruent  Speech:  clear, coherent  Psychomotor Behavior:  no evidence of tardive dyskinesia, dystonia, or tics  Thought Process:  logical and linear  Associations:  no loose associations  Thought Content:  no evidence of suicidal ideation or homicidal ideation and no evidence of psychotic thought  Insight:  fair  Judgment:  intact  Oriented to:  time, person, and place  Attention Span and Concentration:  intact  Recent and Remote Memory:  intact  Language: Able to name objects, Able to repeat phrases and Able to read and write  Fund of Knowledge: appropriate  Muscle Strength and Tone: normal  Gait and Station: ambulates with cane    Time Spent on this Encounter   ISarai, personally saw the patient today and spent greater than 30 minutes discharging this patient.    Discharge Disposition   Discharged to home  Condition at discharge: Stable    Consultations This Hospital Stay   SPIRITUAL HEALTH SERVICES IP  CONSULT    Discharge Orders   No discharge procedures on file.  Discharge Medications   Current Discharge Medication List      START taking these medications    Details   amoxicillin (AMOXIL) 500 MG capsule Take 1 capsule (500 mg) by mouth 3 times daily for 5 days  Qty: 15 capsule, Refills: 0    Associated Diagnoses: Infection      furosemide (LASIX) 20 MG tablet Take 1 tablet (20 mg) by mouth daily  Qty: 30 tablet, Refills: 0    Associated Diagnoses: Localized edema      lithium ER (LITHOBID) 300 MG CR tablet Take 2 tablets (600 mg) by mouth At Bedtime  Qty: 60 tablet, Refills: 0    Associated Diagnoses: Bipolar disorder, current episode manic without psychotic features, severe (H)         CONTINUE these medications which have CHANGED    Details   gabapentin (NEURONTIN) 400 MG capsule Take 1 capsule (400 mg) by mouth 3 times daily  Qty: 30 capsule, Refills: 0    Associated Diagnoses: Bipolar disorder, current episode manic without psychotic features, severe (H)         CONTINUE these medications which have NOT CHANGED    Details   acetaminophen (TYLENOL) 500 MG tablet Take 500 mg by mouth every 6 hours as needed for mild pain      fish oil-omega-3 fatty acids 1000 MG capsule Take 2 g by mouth daily       traZODone (DESYREL) 100 MG tablet Take 100 mg by mouth At Bedtime         STOP taking these medications       lamoTRIgine (LAMICTAL) 150 MG tablet Comments:   Reason for Stopping:         lamoTRIgine (LAMICTAL) 200 MG tablet Comments:   Reason for Stopping:         nortriptyline (PAMELOR) 25 MG capsule Comments:   Reason for Stopping:         QUEtiapine (SEROQUEL) 100 MG tablet Comments:   Reason for Stopping:             Allergies   No Known Allergies

## 2019-10-03 ENCOUNTER — HEALTH MAINTENANCE LETTER (OUTPATIENT)
Age: 64
End: 2019-10-03

## 2020-02-08 ENCOUNTER — HEALTH MAINTENANCE LETTER (OUTPATIENT)
Age: 65
End: 2020-02-08

## 2020-11-07 ENCOUNTER — HEALTH MAINTENANCE LETTER (OUTPATIENT)
Age: 65
End: 2020-11-07

## 2021-03-27 ENCOUNTER — HEALTH MAINTENANCE LETTER (OUTPATIENT)
Age: 66
End: 2021-03-27

## 2021-09-05 ENCOUNTER — HEALTH MAINTENANCE LETTER (OUTPATIENT)
Age: 66
End: 2021-09-05

## 2022-04-17 ENCOUNTER — HEALTH MAINTENANCE LETTER (OUTPATIENT)
Age: 67
End: 2022-04-17

## 2022-10-29 ENCOUNTER — HEALTH MAINTENANCE LETTER (OUTPATIENT)
Age: 67
End: 2022-10-29

## 2023-06-01 ENCOUNTER — HEALTH MAINTENANCE LETTER (OUTPATIENT)
Age: 68
End: 2023-06-01